# Patient Record
Sex: FEMALE | Race: WHITE | Employment: UNEMPLOYED | URBAN - METROPOLITAN AREA
[De-identification: names, ages, dates, MRNs, and addresses within clinical notes are randomized per-mention and may not be internally consistent; named-entity substitution may affect disease eponyms.]

---

## 2017-02-22 ENCOUNTER — TRANSFERRED RECORDS (OUTPATIENT)
Dept: HEALTH INFORMATION MANAGEMENT | Facility: CLINIC | Age: 27
End: 2017-02-22

## 2017-02-22 LAB — PAP-ABSTRACT: NORMAL

## 2018-01-28 ENCOUNTER — HEALTH MAINTENANCE LETTER (OUTPATIENT)
Age: 28
End: 2018-01-28

## 2018-12-18 NOTE — PROGRESS NOTES
SUBJECTIVE:   CC: Flower Logan is an 28 year old woman who presents for preventive health visit.     Physical   Annual:     Getting at least 3 servings of Calcium per day:  Yes    Bi-annual eye exam:  NO    Dental care twice a year:  Yes    Sleep apnea or symptoms of sleep apnea:  None    Diet:  Regular (no restrictions)    Frequency of exercise:  None    Taking medications regularly:  Yes    Medication side effects:  None    Additional concerns today:  Yes    PHQ-2 Total Score: 5      Health Maintenance:  Pap Smear  Done? 2/22/2017 Q3 year years    Depression and Anxiety Follow-Up    Status since last visit: Comes and goes    Other associated symptoms:Panic attack    Complicating factors:     Significant life event: Yes-  Difficulty transitioning from school to finding a job     Current substance abuse: None    PHQ 12/20/2018   PHQ-9 Total Score 13   Q9: Suicide Ideation Not at all     No flowsheet data found.  In the past two weeks have you had thoughts of suicide or self-harm?  No.    Do you have concerns about your personal safety or the safety of others?   No  PHQ-9  English  PHQ-9   Any Language  DERIAN-7  Suicide Assessment Five-step Evaluation and Treatment (SAFE-T)    Today's PHQ-2 Score:   PHQ-2 ( 1999 [employer]) 12/20/2018   Q1: Little interest or pleasure in doing things 3   Q2: Feeling down, depressed or hopeless 2   PHQ-2 Score 5   Q1: Little interest or pleasure in doing things Nearly every day   Q2: Feeling down, depressed or hopeless More than half the days   PHQ-2 Score 5       Abuse: Current or Past(Physical, Sexual or Emotional)- No  Do you feel safe in your environment? Yes    Social History     Tobacco Use     Smoking status: Current Every Day Smoker     Smokeless tobacco: Never Used   Substance Use Topics     Alcohol use: Yes     Comment: 1/2 pack per week      Alcohol Use 12/20/2018   If you drink alcohol do you typically have greater than 3 drinks per day OR greater than 7 drinks per week?  Yes   AUDIT SCORE  26     AUDIT - Alcohol Use Disorders Identification Test - Reproduced from the World Health Organization Audit 2001 (Second Edition) 12/20/2018   1.  How often do you have a drink containing alcohol? 4 or more times a week   2.  How many drinks containing alcohol do you have on a typical day when you are drinking? 7 to 9   3.  How often do you have five or more drinks on one occasion? Weekly   4.  How often during the last year have you found that you were not able to stop drinking once you had started? Weekly   5.  How often during the last year have you failed to do what was normally expected of you because of drinking? Less than monthly   6.  How often during the last year have you needed a first drink in the morning to get yourself going after a heavy drinking session? Monthly   7.  How often during the last year have you had a feeling of guilt or remorse after drinking? Weekly   8.  How often during the last year have you been unable to remember what happened the night before because of your drinking? Weekly   9.  Have you or someone else been injured because of your drinking? No   10. Has a relative, friend, doctor or other health care worker been concerned about your drinking or suggested you cut down? Yes, during the last year   TOTAL SCORE 26       Reviewed orders with patient.  Reviewed health maintenance and updated orders accordingly - Yes  BP Readings from Last 3 Encounters:   12/20/18 124/60   05/26/15 102/62   05/12/15 134/80    Wt Readings from Last 3 Encounters:   12/20/18 80 kg (176 lb 6.4 oz)   05/26/15 66.3 kg (146 lb 2 oz)   05/12/15 64.4 kg (141 lb 14.4 oz)                  There is no problem list on file for this patient.    History reviewed. No pertinent surgical history.    Social History     Tobacco Use     Smoking status: Current Every Day Smoker     Smokeless tobacco: Never Used   Substance Use Topics     Alcohol use: Yes     Comment: 1/2 pack per week      Family  "History   Problem Relation Age of Onset     Family History Negative Mother      Alcoholism Paternal Grandfather          Current Outpatient Medications   Medication Sig Dispense Refill     propranolol (INDERAL) 60 MG tablet Take 60 mg by mouth daily       No Known Allergies  Recent Labs   Lab Test 05/12/15  1403   TSH 1.91        Mammogram not appropriate for this patient based on age.    Pertinent mammograms are reviewed under the imaging tab.  History of abnormal Pap smear: Last 3 Pap and HPV Results:       Reviewed and updated as needed this visit by clinical staff  Tobacco  Allergies  Meds  Med Hx  Surg Hx  Fam Hx  Soc Hx        Reviewed and updated as needed this visit by Provider            Review of Systems   Constitutional: Negative for chills and fever.   HENT: Negative for congestion, ear pain, hearing loss and sore throat.    Eyes: Negative for pain and visual disturbance.   Respiratory: Negative for cough and shortness of breath.    Cardiovascular: Positive for palpitations. Negative for chest pain and peripheral edema.   Gastrointestinal: Negative for abdominal pain, constipation, diarrhea, heartburn, hematochezia and nausea.   Breasts:  Negative for tenderness, breast mass and discharge.   Genitourinary: Negative for dysuria, frequency, genital sores, hematuria, pelvic pain, urgency, vaginal bleeding and vaginal discharge.   Musculoskeletal: Negative for arthralgias, joint swelling and myalgias.   Skin: Negative for rash.   Neurological: Negative for dizziness, weakness, headaches and paresthesias.   Psychiatric/Behavioral: Positive for mood changes. The patient is nervous/anxious.         OBJECTIVE:   /60   Pulse 106   Temp 97.6  F (36.4  C) (Oral)   Ht 1.664 m (5' 5.5\")   Wt 80 kg (176 lb 6.4 oz)   LMP 11/30/2018 (Exact Date)   SpO2 99%   BMI 28.91 kg/m    Physical Exam  GENERAL: healthy, alert and no distress  EYES: Eyes grossly normal to inspection, PERRL and conjunctivae and " sclerae normal  HENT: ear canals and TM's normal, nose and mouth without ulcers or lesions  NECK: no adenopathy, no asymmetry, masses, or scars and thyroid normal to palpation  RESP: lungs clear to auscultation - no rales, rhonchi or wheezes  BREAST: normal without masses, tenderness or nipple discharge and no palpable axillary masses or adenopathy  CV: regular rate and rhythm, normal S1 S2, no S3 or S4, no murmur, click or rub, no peripheral edema and peripheral pulses strong  ABDOMEN: soft, nontender, no hepatosplenomegaly, no masses and bowel sounds normal  MS: no gross musculoskeletal defects noted, no edema  SKIN: no suspicious lesions or rashes  NEURO: Normal strength and tone, mentation intact and speech normal  PSYCH: mentation appears normal, affect normal/bright    Diagnostic Test Results:  none     ASSESSMENT/PLAN:   1. Routine general medical examination at a health care facility  27 yo female here to establish care and for (overdue) annual preventive health physical.  BMI overweight and BP normal.  Remainder of exam findings are normal.  Focused today's visit on alcohol counseling.  Discussed age-appropriate health maintanence.  Additional concerns addressed.    2. Alcohol abuse with other alcohol-induced disorder (H)  Drinks about 10-15 shots of vodka daily.  Is in individual therapy and has seen a psychiatrist.  Is thinking about quitting.  Counseled on health risks of continuing to drink and possibility of withdrawal symptoms.  Advised to quit and offered referral for substance abuse treatment - pt is not ready yet (reports she is 3/10 on readiness to change scale).    - Will check blood counts and CMP today.    - Pt agreed to have care coordinator contact her to discuss further.  - Comprehensive metabolic panel (BMP + Alb, Alk Phos, ALT, AST, Total. Bili, TP)  - CBC with platelets  - CARE COORDINATION REFERRAL    3. DERIAN (generalized anxiety disorder)  Poorly controlled.  Has been prescribed  "trazodone and serotonin specific reuptake inhibitor by psychiatrist, but does not take these.  Is in therapy.  Reports drinking is her form of self-medicating and has good insight about this being unhealthy behavior and that alcohol makes this problem worse.  Is in therapy too.  Alcohol abuse treatment and quitting drinking would be the first step in treatment of condition.    4. Tobacco abuse  Not interested in quitting at this time.  Understands health hazards.    5. Screen for STD (sexually transmitted disease)  - HIV Antigen Antibody Combo  - Treponema Abs w Reflex to RPR and Titer    6. Screening for endocrine, metabolic and immunity disorder  - Hemoglobin A1c    8. Need for Tdap vaccination  - VACCINE ADMINISTRATION, INITIAL  - TDAP VACCINE (ADACEL)    COUNSELING:  Reviewed preventive health counseling, as reflected in patient instructions    BP Readings from Last 1 Encounters:   12/20/18 124/60     Estimated body mass index is 28.91 kg/m  as calculated from the following:    Height as of this encounter: 1.664 m (5' 5.5\").    Weight as of this encounter: 80 kg (176 lb 6.4 oz).      Weight management plan: Discussed healthy diet and exercise guidelines     reports that she has been smoking.  she has never used smokeless tobacco.  Tobacco Cessation Action Plan: Information offered: Patient not interested at this time    Counseling Resources:  ATP IV Guidelines  Pooled Cohorts Equation Calculator  Breast Cancer Risk Calculator  FRAX Risk Assessment  ICSI Preventive Guidelines  Dietary Guidelines for Americans, 2010  USDA's MyPlate  ASA Prophylaxis  Lung CA Screening    Jose A Godinez MD  [facility]  Answers for HPI/ROS submitted by the patient on 12/20/2018   Annual Exam:  If you checked off any problems, how difficult have these problems made it for you to do your work, take care of things at home, or get along with other people?: Very difficult  PHQ9 TOTAL SCORE: 13    "

## 2018-12-18 NOTE — PATIENT INSTRUCTIONS
Tetanus shot  Lab draw today - I will contact you via [employer] with results  Referral to my  (she will contact you)  Let me know when you are ready for a referral for substance abuse treatment      Patient Education     Prevention Guidelines, Women Ages 18 to 39  Screening tests and vaccines are an important part of managing your health. A screening test is done to find possible disorders or diseases in people who don't have any symptoms. The goal is to find a disease early so lifestyle changes can be made and you can be watched more closely to reduce the risk of disease, or to detect it early enough to treat it most effectively. Screening tests are not considered diagnostic, but are used to determine if more testing is needed. Health counseling is essential, too. Below are guidelines for these, for women ages 18 to 39. Talk with your healthcare provider to make sure you re up-to-date on what you need.  Screening Who needs it How often   Alcohol misuse All women in this age group At routine exams   Blood pressure All women in this age group Yearly checkup if your blood pressure is normal  Normal blood pressure is less than 120/80 mm Hg  If your blood pressure reading is higher than normal, follow the advice of your healthcare provider   Breast cancer All women in this age group should talk with their healthcare providers about the need for clinical breast exams (CBE)1 Clinical breast exam every 3 years1   Cervical cancer Women ages 21 and older Women between ages 21 and 29 should have a Pap test every 3 years; women between ages 30 and 65 are advised to have a Pap test plus an HPV test every 5 years   Chlamydia Sexually active women ages 25 and younger, and women at increased risk for infection (such as having multiple sex partners) Every year if you're at risk or have symptoms   Depression All women in this age group At routine exams   Type 2 diabetes, prediabetes All women with no symptoms who are  overweight or obese and have 1 or more other risk factors for diabetes At least every 3 years. Also, testing for diabetes during pregnancy after the 24th week.    Type 2 diabetes, prediabetes All women diagnosed with gestational diabetes Lifelong testing every 3 years   Type 2 diabetes All women with prediabetes Every year   Gonorrhea Sexually active women at increased risk for infection At routine exams   Hepatitis C Anyone at increased risk At routine exams   HIV All women should be tested at least once for HIV between the ages of 13 and 64 At routine exams. Those with risk factors for HIV should be tested at least annually.    Obesity All women in this age group At routine exams   Syphilis Women at increased risk for infection should talk with their healthcare provider At routine exams   Tuberculosis Women at increased risk for infection should talk with their healthcare provider Ask your healthcare provider   Vision All women in this age group At least 1 complete exam in your 20s, and 2 in your 30s   Vaccine2 Who needs it How often   Chickenpox (varicella) All women in this age group who have no record of this infection or vaccine 2 doses; the second dose should be given 4 to 8 weeks after the first dose   Hepatitis A Women at increased risk for infection should talk with their healthcare provider 2 doses given at least 6 months apart   Hepatitis B Women at increased risk for infection should talk with their healthcare provider 3 doses over 6 months; second dose should be given 1 month after the first dose; the third dose should be given at least 2 months after the second dose and at least 4 months after the first dose   Haemophilus influenzae Type B (HIB) Women at increased risk for infection should talk with their healthcare provider 1 to 3 doses   Human papillomavirus (HPV) All women in this age group up to age 26 3 doses; the second dose should be given 1 to 2 months after the first dose and the third dose  given 6 months after the first dose   Influenza (flu) All women in this age group Once a year   Measles, mumps, rubella (MMR) All women in this age group who have no record of these infections or vaccines 1 or 2 doses   Meningococcal Women at increased risk for infection should talk with their healthcare provider 1 or more doses   Pneumococcal conjugate vaccine (PCV13) and pneumococcal polysaccharide vaccine (PPSV23) Women at increased risk for infection should talk with their healthcare provider PCV13: 1 dose ages 19 to 65 (protects against 13 types of pneumococcal bacteria)  PPSV23: 1 to 2 doses through age 64, or 1 dose at 65 or older (protects against 23 types of pneumococcal bacteria)      Tetanus/diphtheria/pertussis (Td/Tdap) booster All women in this age group Td every 10 years, or a one-time dose of Tdap instead of a Td booster after age 18, then Td every 10 years   Counseling Who needs it How often   BRCA gene mutation testing for breast and ovarian cancer susceptibility Women with increased risk for having gene mutation When your risk is known   Breast cancer and chemoprevention Women at high risk for breast cancer When your risk is known   Diet and exercise Women who are overweight or obese When diagnosed, and then at routine exams   Domestic violence Women at the age in which they are able to have children At routine exams   Sexually transmitted infection prevention Women who are sexually active At routine exams   Skin cancer Prevention of skin cancer in fair-skinned adults At routine exams   Use of tobacco and the health effects it can cause All women in this age group Every visit   1 According to the ACS, women ages 20 to 39 years should have a clinical breast exam (CBE) as part of their routine health exam every 3 years. Breast self-exams are an option for women starting in their 20s. But the USPSTF does not recommend CBE.  Date Last Reviewed: 10/1/2017    8568-3092 [employer] [address]  [place] [address]. All rights reserved. This information is not intended as a substitute for professional medical care. Always follow your healthcare professional's instructions.

## 2018-12-20 ENCOUNTER — OFFICE VISIT (OUTPATIENT)
Dept: PEDIATRICS | Facility: CLINIC | Age: 28
End: 2018-12-20
Payer: COMMERCIAL

## 2018-12-20 VITALS
DIASTOLIC BLOOD PRESSURE: 60 MMHG | TEMPERATURE: 97.6 F | WEIGHT: 176.4 LBS | OXYGEN SATURATION: 99 % | BODY MASS INDEX: 28.35 KG/M2 | HEART RATE: 106 BPM | SYSTOLIC BLOOD PRESSURE: 124 MMHG | HEIGHT: 66 IN

## 2018-12-20 DIAGNOSIS — F41.1 GAD (GENERALIZED ANXIETY DISORDER): ICD-10-CM

## 2018-12-20 DIAGNOSIS — Z13.0 SCREENING FOR ENDOCRINE, METABOLIC AND IMMUNITY DISORDER: ICD-10-CM

## 2018-12-20 DIAGNOSIS — Z72.0 TOBACCO ABUSE: ICD-10-CM

## 2018-12-20 DIAGNOSIS — F10.188 ALCOHOL ABUSE WITH OTHER ALCOHOL-INDUCED DISORDER (H): ICD-10-CM

## 2018-12-20 DIAGNOSIS — Z00.00 ROUTINE GENERAL MEDICAL EXAMINATION AT A HEALTH CARE FACILITY: Primary | ICD-10-CM

## 2018-12-20 DIAGNOSIS — Z11.3 SCREEN FOR STD (SEXUALLY TRANSMITTED DISEASE): ICD-10-CM

## 2018-12-20 DIAGNOSIS — Z23 NEED FOR TDAP VACCINATION: ICD-10-CM

## 2018-12-20 DIAGNOSIS — Z13.228 SCREENING FOR ENDOCRINE, METABOLIC AND IMMUNITY DISORDER: ICD-10-CM

## 2018-12-20 DIAGNOSIS — Z13.29 SCREENING FOR ENDOCRINE, METABOLIC AND IMMUNITY DISORDER: ICD-10-CM

## 2018-12-20 LAB
ERYTHROCYTE [DISTWIDTH] IN BLOOD BY AUTOMATED COUNT: 12.3 % (ref 10–15)
HBA1C MFR BLD: 5.1 % (ref 0–5.6)
HCT VFR BLD AUTO: 42.9 % (ref 35–47)
HGB BLD-MCNC: 14 G/DL (ref 11.7–15.7)
MCH RBC QN AUTO: 34.2 PG (ref 26.5–33)
MCHC RBC AUTO-ENTMCNC: 32.6 G/DL (ref 31.5–36.5)
MCV RBC AUTO: 105 FL (ref 78–100)
PLATELET # BLD AUTO: 296 10E9/L (ref 150–450)
RBC # BLD AUTO: 4.09 10E12/L (ref 3.8–5.2)
WBC # BLD AUTO: 7.8 10E9/L (ref 4–11)

## 2018-12-20 PROCEDURE — 85027 COMPLETE CBC AUTOMATED: CPT | Performed by: INTERNAL MEDICINE

## 2018-12-20 PROCEDURE — 90715 TDAP VACCINE 7 YRS/> IM: CPT | Performed by: INTERNAL MEDICINE

## 2018-12-20 PROCEDURE — 83036 HEMOGLOBIN GLYCOSYLATED A1C: CPT | Performed by: INTERNAL MEDICINE

## 2018-12-20 PROCEDURE — 99385 PREV VISIT NEW AGE 18-39: CPT | Mod: 25 | Performed by: INTERNAL MEDICINE

## 2018-12-20 PROCEDURE — 87389 HIV-1 AG W/HIV-1&-2 AB AG IA: CPT | Performed by: INTERNAL MEDICINE

## 2018-12-20 PROCEDURE — 86780 TREPONEMA PALLIDUM: CPT | Performed by: INTERNAL MEDICINE

## 2018-12-20 PROCEDURE — 80053 COMPREHEN METABOLIC PANEL: CPT | Performed by: INTERNAL MEDICINE

## 2018-12-20 PROCEDURE — 36415 COLL VENOUS BLD VENIPUNCTURE: CPT | Performed by: INTERNAL MEDICINE

## 2018-12-20 PROCEDURE — 90471 IMMUNIZATION ADMIN: CPT | Performed by: INTERNAL MEDICINE

## 2018-12-20 RX ORDER — PROPRANOLOL HYDROCHLORIDE 60 MG/1
60 TABLET ORAL DAILY
COMMUNITY
End: 2020-08-03

## 2018-12-20 ASSESSMENT — ENCOUNTER SYMPTOMS
PALPITATIONS: 1
CHILLS: 0
JOINT SWELLING: 0
DYSURIA: 0
PARESTHESIAS: 0
DIARRHEA: 0
HEMATURIA: 0
DIZZINESS: 0
MYALGIAS: 0
SHORTNESS OF BREATH: 0
ABDOMINAL PAIN: 0
CONSTIPATION: 0
HEARTBURN: 0
COUGH: 0
ARTHRALGIAS: 0
EYE PAIN: 0
SORE THROAT: 0
HEMATOCHEZIA: 0
BREAST MASS: 0
FEVER: 0
NAUSEA: 0
NERVOUS/ANXIOUS: 1
HEADACHES: 0
FREQUENCY: 0
WEAKNESS: 0

## 2018-12-20 ASSESSMENT — ANXIETY QUESTIONNAIRES
GAD7 TOTAL SCORE: 21
1. FEELING NERVOUS, ANXIOUS, OR ON EDGE: NEARLY EVERY DAY
IF YOU CHECKED OFF ANY PROBLEMS ON THIS QUESTIONNAIRE, HOW DIFFICULT HAVE THESE PROBLEMS MADE IT FOR YOU TO DO YOUR WORK, TAKE CARE OF THINGS AT HOME, OR GET ALONG WITH OTHER PEOPLE: EXTREMELY DIFFICULT
7. FEELING AFRAID AS IF SOMETHING AWFUL MIGHT HAPPEN: NEARLY EVERY DAY
3. WORRYING TOO MUCH ABOUT DIFFERENT THINGS: NEARLY EVERY DAY
2. NOT BEING ABLE TO STOP OR CONTROL WORRYING: NEARLY EVERY DAY
6. BECOMING EASILY ANNOYED OR IRRITABLE: NEARLY EVERY DAY
5. BEING SO RESTLESS THAT IT IS HARD TO SIT STILL: NEARLY EVERY DAY

## 2018-12-20 ASSESSMENT — MIFFLIN-ST. JEOR: SCORE: 1548.96

## 2018-12-20 ASSESSMENT — PATIENT HEALTH QUESTIONNAIRE - PHQ9
SUM OF ALL RESPONSES TO PHQ QUESTIONS 1-9: 13
5. POOR APPETITE OR OVEREATING: NEARLY EVERY DAY
10. IF YOU CHECKED OFF ANY PROBLEMS, HOW DIFFICULT HAVE THESE PROBLEMS MADE IT FOR YOU TO DO YOUR WORK, TAKE CARE OF THINGS AT HOME, OR GET ALONG WITH OTHER PEOPLE: VERY DIFFICULT
SUM OF ALL RESPONSES TO PHQ QUESTIONS 1-9: 13

## 2018-12-21 ENCOUNTER — PATIENT OUTREACH (OUTPATIENT)
Dept: CARE COORDINATION | Facility: CLINIC | Age: 28
End: 2018-12-21

## 2018-12-21 LAB
ALBUMIN SERPL-MCNC: 4.2 G/DL (ref 3.4–5)
ALP SERPL-CCNC: 81 U/L (ref 40–150)
ALT SERPL W P-5'-P-CCNC: 107 U/L (ref 0–50)
ANION GAP SERPL CALCULATED.3IONS-SCNC: 12 MMOL/L (ref 3–14)
AST SERPL W P-5'-P-CCNC: 105 U/L (ref 0–45)
BILIRUB SERPL-MCNC: 0.6 MG/DL (ref 0.2–1.3)
BUN SERPL-MCNC: 12 MG/DL (ref 7–30)
CALCIUM SERPL-MCNC: 10 MG/DL (ref 8.5–10.1)
CHLORIDE SERPL-SCNC: 105 MMOL/L (ref 94–109)
CO2 SERPL-SCNC: 19 MMOL/L (ref 20–32)
CREAT SERPL-MCNC: 0.74 MG/DL (ref 0.52–1.04)
GFR SERPL CREATININE-BSD FRML MDRD: >90 ML/MIN/{1.73_M2}
GLUCOSE SERPL-MCNC: 72 MG/DL (ref 70–99)
POTASSIUM SERPL-SCNC: 4.7 MMOL/L (ref 3.4–5.3)
PROT SERPL-MCNC: 8.2 G/DL (ref 6.8–8.8)
SODIUM SERPL-SCNC: 136 MMOL/L (ref 133–144)

## 2018-12-21 ASSESSMENT — ACTIVITIES OF DAILY LIVING (ADL): DEPENDENT_IADLS:: INDEPENDENT

## 2018-12-21 ASSESSMENT — PATIENT HEALTH QUESTIONNAIRE - PHQ9: SUM OF ALL RESPONSES TO PHQ QUESTIONS 1-9: 13

## 2018-12-21 ASSESSMENT — ANXIETY QUESTIONNAIRES: GAD7 TOTAL SCORE: 21

## 2018-12-21 NOTE — PROGRESS NOTES
Clinic Care Coordination Contact  OUTREACH    Referral Information: Mental Wellness/ Chemical Health Assessments- Pt has a therapist and psychiatrist - 3/10 ready to make a change     Referral Source: PCP    Chief Complaint   Patient presents with     Clinic Care Coordination - Initial     Dual- MH/CD        Universal Utilization: No major concerns noted  Clinic Utilization is sporadic.   Utilization    Last refreshed: 12/20/2018  6:34 PM:  Hospital Admissions 0           Last refreshed: 12/20/2018  6:34 PM:  ED Visits 0           Last refreshed: 12/20/2018  6:34 PM:  No Show Count (past year) 0              Current as of: 12/20/2018  6:34 PM            Clinical Concerns:  Current Medical Concerns:Pt presenting to the clinic for a routine medical examination.  Pt noted to have anxiety/depression and chemical dependency.   Current Behavioral Concerns: Alcohol Dependence, Daily Tobacco Use    Motivational Interviewing  Target Behavior: alcohol use    Stage of Change: CONTEMPLATION (Considering change and yet undecided)    MI Intervention: Expressed Empathy/Understanding, Permission to raise concern or advise, Reflections: simple and complex and Change talk (evoked)     Change Talk Expressed by the Patient: Reasons to change Need to change Taking steps    Provider Response to Change Talk: E - Evoked more info from patient about behavior change, A - Affirmed patient's thoughts, decisions, or attempts at behavior change, R - Reflected patient's change talk and S - Summarized patient's change talk statements    Education Provided to patient:  Offered to send pt information about available programs that could potentially be a good fit for the patient.   Health Maintenance Reviewed: Due/Overdue   Clinical Pathway: Clinic Care Coordination Anxiety Assessment  Anxiety  Currently being treated or treated in past for your anxiety?: Yes  Type of treatment:: Counseling, Medication  Based on results of your GAD7 you may be  experiencing anxiety: Yes  Is your anxiety:: Moderate  Picture where you want to be in the future, what steps would you take to get there?: Social, Medications, Alternative Therapy  Clinic Care Coordinator - Depression Initial Assessment  Pt identified current zone as: Yellow  Pt identifies current symptoms of: change in sleeping pattern, feelings of worthlessness, inactivity or withdrawal from typically pleasurable activities and feelings of hopelessness and helplessness    Most recent PHQ-9 score:   PHQ-9 SCORE 12/20/2018   PHQ-9 Total Score MyChart 13 (Moderate depression)   PHQ-9 Total Score 13     Pt is taking medications as prescribed:  Yes  Currently being treated or treated in the past for your depression?: Yes  Patient is using individual therapy and medication(s) Trazodone to treat depression  Benefits of medication were  were  discussed.   Patient indicates good understanding of medication and need to continue to take, even if feeling better.    Use of caffeine, alcohol, and other mood-altering substances   were  discussed.    Patient was encouraged to pursue CD treatment. Pt is still hopeful that she can continue to drink alcohol, but in a controlled moderated way. Pt is not interested in pursuing programs that promote lifelong abstinence.   Benefits of counseling  were  discussed.   Patient is is interested in seeing a counselor   Patient was assisted with mental health resources: CD treatment. Will consider options provided. Still in contemplation phase.    Medication Management:  Followed by psychiatry.     Living Situation:  Current living arrangement:: I live alone  Type of residence:: Apartment     Psychosocial:  Faith or spiritual beliefs that impact treatment:: No  Mental health DX:: Yes  Mental health DX how managed:: Medication, Outpatient Counseling  Mental health management concern: Yes- Pt has been working with a counselor on a weekly basis for about a year and has shown very little  improvement with alcohol consumption.   Informal Support system:: Parent, Friends, Significant other  Financial/Insurance: Pt does not have financial concerns at this time. Parents are supportive and helpful.     Resources and Interventions:  Community Resources: OP Mental Health  Referrals Placed: Sent pt information about available treatment options.     Patient/Caregiver understanding: Pt reports understanding and denies any additional questions or concerns at this times. RAHUL MCKEON engaged in AIDET communication during encounter.    Outreach Frequency: monthly    Plan: RAHUL MCKEON will provide patient with resources for her to review. Pt will look at resources and discuss with a counselor. RAHUL CC to follow-up in 4 weeks.     CHRISTEN Romero  Clinic Care Coordinator  [phone]  [email or web address]

## 2018-12-21 NOTE — LETTER
[facility] CARE COORDINATION  [address] [place] [address]    December 26, 2018    Flower Logan  [address] [place] [address]      Dear Flower,    I am a clinic care coordinator who works at the [facility]. I wanted to thank you for spending the time to talk with me.  I wanted to introduce myself and provide you with my contact information so that you can call me with questions or concerns about your health care. Included is a flyer with a description of clinic care coordination and how I can further assist you.     I have enclosed some educational material. Please review and call me with any questions.    [facility] call [phone]  [email or web address]-[email or web address]-[email or web address]  Outpatient Treatment Options at [facility]  All of our programs are designed to help patients understand the negative effects of alcohol and/or drugs on their lives and relationships. Patients will work with their counselor to develop a personalized plan that outlines treatment goals. By working towards these goals, individuals will develop a positive self-image and learn the coping skills necessary to achieve and maintain recovery.   Our outpatient treatment options include:   Intensive outpatient treatment  Outpatient treatment  Co-occurring outpatient groups    [facility]  [facility]  [address]  [address] [place], [place] [address]. [place] [address]  Addiction Medicine provides ongoing care for patients with the disease of addiction, ages 16 and up.   Common problems that may warrant referral to the Addiction Medicine Service are:  Alcohol use disorder - diagnosis, treatment referral, acute and protracted withdrawal management, and ongoing medication assisted treatment including Antabuse and Naltrexone.      [facility]  [email or web address]  [phone]  [facility] is an abstinence-based, gender-responsive, and trauma-informed outpatient treatment program for adult women experiencing substance use and mental health concerns. Outpatient treatment is designed for individuals who are able to address their behavioral health issues while living at home and continuing to work, attend school, and fulfill family responsibilities. Program services are available during both morning and evening hours to accommodate individual schedules.    [facility]  [phone]  [email or web address]/our-[email or web address]/drug-[email or web address]-treatment/adult-co-occurring-chemical-dependency-treatment/  [place], [place], [place], [place]  [facility] ([facility]) is a group of professional Anglican clinicians from the fields of psychology, clinical social work, marriage & family therapy, nursing, and psychiatry, who are committed to helping persons experiencing personal, emotional, marriage, family, or psychological problems.    Online or Informal Forums May also be beneficial  [facility]   https://www.[employer]recR[employer].org/addiction-recovery/alcohol-abuse/    Article discussing Abstinence vs. Moderation  https://www.[employer]rec[employer].org/smart-articles/pwdfufghrp-ti-mtaofehcqt/    I hope this information is helpful. Please feel free to contact me at [phone], with any questions or concerns.     Sincerely,     Nany Bashir

## 2018-12-22 LAB — T PALLIDUM AB SER QL: NONREACTIVE

## 2018-12-24 LAB — HIV 1+2 AB+HIV1 P24 AG SERPL QL IA: NONREACTIVE

## 2019-01-31 ENCOUNTER — PATIENT OUTREACH (OUTPATIENT)
Dept: CARE COORDINATION | Facility: CLINIC | Age: 29
End: 2019-01-31

## 2019-01-31 NOTE — PROGRESS NOTES
Clinic Care Coordination Contact  OUTREACH    Chief Complaint   Patient presents with     Clinic Care Coordination - Follow-up     Behavioral Health     Clinical Concerns:  Current Medical Concerns:  Pt had presented to the clinic for a routine medical exam. During visit pt was noted to have alcohol abuse with other alcohol induced disorder. RAHUL MCKEON reached out to pt to provide resources for Chemical Dependency treatment. Pt was in the pre contemplation phase, but was open to learning about resources that were available.     Follow-up with patient today to follow up. Pt reports that she was able to find a job and is now employed full time which has helped some. Pt is still drinking about 4 x a week. Discussed the e-mail and pt isn't interested in setting anything up at this time.     Reviewed recommendations from PCP. Pt reports that she was supposed to follow-up in one month. Offered to assist with appointment scheduling. Pt declines and will call to schedule.     Patient/Caregiver understanding: Pt reports understanding and denies any additional questions or concerns at this times. RAHUL MCKEON engaged in AIDET communication during encounter.    Plan: No further outreaches will be made at this time unless a new referral is made or a change in the pt's status occurs. Patient was provided with this writer's contact information and encouraged to call with any questions or concerns.    CHRISTEN Romero  Clinic Care Coordinator  [phone]  [email or web address]

## 2019-06-25 ENCOUNTER — OFFICE VISIT (OUTPATIENT)
Dept: URGENT CARE | Facility: URGENT CARE | Age: 29
End: 2019-06-25
Payer: COMMERCIAL

## 2019-06-25 VITALS
HEART RATE: 92 BPM | RESPIRATION RATE: 18 BRPM | WEIGHT: 176 LBS | DIASTOLIC BLOOD PRESSURE: 64 MMHG | OXYGEN SATURATION: 99 % | SYSTOLIC BLOOD PRESSURE: 120 MMHG | TEMPERATURE: 97.4 F | BODY MASS INDEX: 28.84 KG/M2

## 2019-06-25 DIAGNOSIS — J02.9 SORE THROAT: Primary | ICD-10-CM

## 2019-06-25 LAB
DEPRECATED S PYO AG THROAT QL EIA: NORMAL
SPECIMEN SOURCE: NORMAL

## 2019-06-25 PROCEDURE — 99213 OFFICE O/P EST LOW 20 MIN: CPT | Performed by: PHYSICIAN ASSISTANT

## 2019-06-25 PROCEDURE — 87880 STREP A ASSAY W/OPTIC: CPT | Performed by: PHYSICIAN ASSISTANT

## 2019-06-25 PROCEDURE — 87081 CULTURE SCREEN ONLY: CPT | Performed by: PHYSICIAN ASSISTANT

## 2019-06-25 NOTE — PROGRESS NOTES
SUBJECTIVE:  Flower Logan is a 29 year old female with a chief complaint of sore throat.  Onset of symptoms was 3 day(s) ago.    Course of illness: gradual onset and still present.  Severity moderate  Current and Associated symptoms: ear pain bilateral submandibular swelling.    Has used antacids for esophageal discomfort.  Treatment measures tried include ranitidine 150 mg once a day, tums, pepto, propping head.  Predisposing factors include esphagitis, GERD, alcoholism, smoking, anxiety.    No vomiting, no blood.    No past medical history on file.  Current Outpatient Medications   Medication Sig Dispense Refill     propranolol (INDERAL) 60 MG tablet Take 60 mg by mouth daily       Social History     Tobacco Use     Smoking status: Current Every Day Smoker     Smokeless tobacco: Never Used   Substance Use Topics     Alcohol use: Yes     Comment: 1/2 pack per week    daily alcohol: 4-8 oz daily vodka    ROS:  Review of systems negative except as stated above.    OBJECTIVE:   /64   Pulse 92   Temp 97.4  F (36.3  C)   Resp 18   Wt 79.8 kg (176 lb)   SpO2 99%   BMI 28.84 kg/m    GENERAL APPEARANCE: healthy, alert and no distress  EYES: EOMI,  PERRL, conjunctiva clear  HENT: ear canals and TM's normal.  Nose normal.  Pharynx erythematous with some exudate noted.  NECK: supple, non-tender to palpation, no adenopathy noted  RESP: lungs clear to auscultation - no rales, rhonchi or wheezes  CV: regular rates and rhythm, normal S1 S2, no murmur noted  ABDOMEN:  soft, nontender, no HSM or masses and bowel sounds normal  SKIN: no suspicious lesions or rashes    Results for orders placed or performed in visit on 06/25/19   Rapid strep screen   Result Value Ref Range    Specimen Description Throat     Rapid Strep A Screen       NEGATIVE: No Group A streptococcal antigen detected by immunoassay, await culture report.   Beta strep group A culture   Result Value Ref Range    Specimen Description Throat     Culture  Micro No beta hemolytic Streptococcus Group A isolated          ASSESSMENT:  (J02.9) Sore throat  (primary encounter diagnosis)  Comment: Likely esophagitis vs viral   Plan: Rapid strep screen, Beta strep group A culture        Patient Instructions   Follow up with Ghazal in 1-2 weeks.    ED if red flag symptoms arise      Patient Education     GERD (Adult)    The esophagus is a tube that carries food from the mouth to the stomach. A valve (the LES, lower esophageal sphincter) at the lower end of the esophagus prevents stomach acid from flowing upward. When this valve doesn't work properly, stomach contents may repeatedly flow back up (reflux) into the esophagus. This is called gastroesophageal reflux disease (GERD). GERD can irritate the esophagus. It can cause problems with pain, swallowing or breathing. In severe cases, GERD can cause recurrent pneumonia (from aspiration or breathing in particles) or other serious problems.  Symptoms of reflux include burning, pressure or sharp pain in the upper abdomen or mid to lower chest. The pain can spread to the neck, back, or shoulder. There may be belching, an acid taste in the back of the throat, chronic cough, or sore throat, or hoarseness. GERD symptoms often occur during the day after a big meal. They can also occur at night when lying down.   Home care  Lifestyle changes can help reduce symptoms. If needed, your healthcare provider may prescribe medicines. Symptoms often improve with treatment, but if treatment is stopped, the symptoms often return after a few months. So most persons with GERD will need to continue treatment or get treatment on and off.  Lifestyle changes    Limit or avoid fatty, fried, and spicy foods, as well as coffee, chocolate, mint, and foods with high acid content such as tomatoes and citrus fruit and juices (orange, grapefruit, lemon).    Don t eat large meals, especially at night. Frequent, smaller meals are best. Don't lie down right after  "eating. And don t eat anything 3 hours before going to bed.    Don't drink alcohol or smoke. As much as possible, stay away from second hand smoke.    If you are overweight, losing weight will reduce symptoms.     Don't wear tight clothing around your stomach area.    If your symptoms occur during sleep, use a foam wedge to elevate your upper body (not just your head.) Or, place 4\" blocks under the head of your bed. Or use 2 bed risers under your bedframe.  Medicines  If needed, medicines can help relieve the symptoms of GERD and prevent damage to the esophagus. Discuss a medicine plan with your healthcare provider. This may include one or more of the following medicines:    Antacids to help neutralize the normal acids in your stomach.    Acid blockers (Histamine or H2 blockers) to decrease acid production.    Acid inhibitors (proton pump inhibitors PPIs) to decrease acid production in a different way than the blockers. They may work better, but can take a little longer to take effect.  Take an antacid 30 to 60 minutes after eating and at bedtime, but not at the same time as an acid blocker.Try not to take medicines such as ibuprofen and aspirin. If you are taking aspirin for your heart or other medical reasons, talk to your healthcare provider about stopping it.  Follow-up care  Follow up with your healthcare provider or as advised by our staff.  When to seek medical advice  Call your healthcare provider if any of the following occur:    Stomach pain gets worse or moves to the lower right abdomen (appendix area)    Chest pain appears or gets worse, or spreads to the back, neck, shoulder, or arm    An over-the-counter trial of medicine doesn't relieve your symptoms    Weight loss that can't be explained    Trouble or pain swallowing    Frequent vomiting (can t keep down liquids)    Blood in the stool or vomit (red or black in color)    Feeling weak or dizzy    Fever of 100.4 F (38 C) or higher, or as directed by " your healthcare provider  Date Last Reviewed: 3/1/2018    9217-3661 [employer]. [address] [place] [address], [place] [address]. All rights reserved. This information is not intended as a substitute for professional medical care. Always follow your healthcare professional's instructions.

## 2019-06-25 NOTE — PATIENT INSTRUCTIONS
Follow up with Ghazal in 1-2 weeks.    ED if red flag symptoms arise      Patient Education     GERD (Adult)    The esophagus is a tube that carries food from the mouth to the stomach. A valve (the LES, lower esophageal sphincter) at the lower end of the esophagus prevents stomach acid from flowing upward. When this valve doesn't work properly, stomach contents may repeatedly flow back up (reflux) into the esophagus. This is called gastroesophageal reflux disease (GERD). GERD can irritate the esophagus. It can cause problems with pain, swallowing or breathing. In severe cases, GERD can cause recurrent pneumonia (from aspiration or breathing in particles) or other serious problems.  Symptoms of reflux include burning, pressure or sharp pain in the upper abdomen or mid to lower chest. The pain can spread to the neck, back, or shoulder. There may be belching, an acid taste in the back of the throat, chronic cough, or sore throat, or hoarseness. GERD symptoms often occur during the day after a big meal. They can also occur at night when lying down.   Home care  Lifestyle changes can help reduce symptoms. If needed, your healthcare provider may prescribe medicines. Symptoms often improve with treatment, but if treatment is stopped, the symptoms often return after a few months. So most persons with GERD will need to continue treatment or get treatment on and off.  Lifestyle changes    Limit or avoid fatty, fried, and spicy foods, as well as coffee, chocolate, mint, and foods with high acid content such as tomatoes and citrus fruit and juices (orange, grapefruit, lemon).    Don t eat large meals, especially at night. Frequent, smaller meals are best. Don't lie down right after eating. And don t eat anything 3 hours before going to bed.    Don't drink alcohol or smoke. As much as possible, stay away from second hand smoke.    If you are overweight, losing weight will reduce symptoms.     Don't wear tight clothing around your  "stomach area.    If your symptoms occur during sleep, use a foam wedge to elevate your upper body (not just your head.) Or, place 4\" blocks under the head of your bed. Or use 2 bed risers under your bedframe.  Medicines  If needed, medicines can help relieve the symptoms of GERD and prevent damage to the esophagus. Discuss a medicine plan with your healthcare provider. This may include one or more of the following medicines:    Antacids to help neutralize the normal acids in your stomach.    Acid blockers (Histamine or H2 blockers) to decrease acid production.    Acid inhibitors (proton pump inhibitors PPIs) to decrease acid production in a different way than the blockers. They may work better, but can take a little longer to take effect.  Take an antacid 30 to 60 minutes after eating and at bedtime, but not at the same time as an acid blocker.Try not to take medicines such as ibuprofen and aspirin. If you are taking aspirin for your heart or other medical reasons, talk to your healthcare provider about stopping it.  Follow-up care  Follow up with your healthcare provider or as advised by our staff.  When to seek medical advice  Call your healthcare provider if any of the following occur:    Stomach pain gets worse or moves to the lower right abdomen (appendix area)    Chest pain appears or gets worse, or spreads to the back, neck, shoulder, or arm    An over-the-counter trial of medicine doesn't relieve your symptoms    Weight loss that can't be explained    Trouble or pain swallowing    Frequent vomiting (can t keep down liquids)    Blood in the stool or vomit (red or black in color)    Feeling weak or dizzy    Fever of 100.4 F (38 C) or higher, or as directed by your healthcare provider  Date Last Reviewed: 3/1/2018    8865-2293 [employer]. [address] [place] [address]. All rights reserved. This information is not intended as a substitute for professional medical care. Always follow " your healthcare professional's instructions.

## 2019-06-26 LAB
BACTERIA SPEC CULT: NORMAL
SPECIMEN SOURCE: NORMAL

## 2019-11-06 ENCOUNTER — NURSE TRIAGE (OUTPATIENT)
Dept: ONCOLOGY | Facility: CLINIC | Age: 29
End: 2019-11-06

## 2019-11-06 NOTE — TELEPHONE ENCOUNTER
"  Patient took a ranitidine with no relief, at least not noticeably  Additional Information    Mild abdominal pain    Alcohol abuse known or suspected    Answer Assessment - Initial Assessment Questions  1. LOCATION: \"Where does it hurt?\"       Pain is on right upper abdomin  2. RADIATION: \"Does the pain shoot anywhere else?\" (e.g., chest, back)      Radiates a little to her back on the upper right side  3. ONSET: \"When did the pain begin?\" (e.g., minutes, hours or days ago)       When she woke up this morning around 10am  4. SUDDEN: \"Gradual or sudden onset?\"       Gradually got worse throughout the day  5. PATTERN \"Does the pain come and go, or is it constant?\"     - If constant: \"Is it getting better, staying the same, or worsening?\"       (Note: Constant means the pain never goes away completely; most serious pain is constant and it progresses)      - If intermittent: \"How long does it last?\" \"Do you have pain now?\"      (Note: Intermittent means the pain goes away completely between bouts)      Constant - stable  6. SEVERITY: \"How bad is the pain?\"  (e.g., Scale 1-10; mild, moderate, or severe)     - MILD (1-3): doesn't interfere with normal activities, abdomen soft and not tender to touch      - MODERATE (4-7): interferes with normal activities or awakens from sleep, tender to touch      - SEVERE (8-10): excruciating pain, doubled over, unable to do any normal activities        4  7. RECURRENT SYMPTOM: \"Have you ever had this type of abdominal pain before?\" If so, ask: \"When was the last time?\" and \"What happened that time?\"       She thinks she might have and was related to constipation: last Bowel movement 1-2 days ago; patient goes every other day  8. AGGRAVATING FACTORS: \"Does anything seem to cause this pain?\" (e.g., foods, stress, alcohol)      Patient did drink last night and has had a lot of stress/anxiety from work  9. CARDIAC SYMPTOMS: \"Do you have any of the following symptoms: chest pain, " "difficulty breathing, sweating, nausea?\"      Denies  10. OTHER SYMPTOMS: \"Do you have any other symptoms?\" (e.g., fever, vomiting, diarrhea)        Denies  11. PREGNANCY: \"Is there any chance you are pregnant?\" \"When was your last menstrual period?\"        Last menstrual period was around 20 days ago    Protocols used: ABDOMINAL PAIN - UPPER-A-OH    After triaging patient, she stated that she does have a problem with alcohol abuse and would like to be seen. She is concerned her symptoms are more related to this.    Spoke to Dora Zepeda and she is ok with seeing patient. Patient scheduled for tomorrow at 9:40am.  Nallely LUCIANO RN, BSN    "

## 2019-12-31 ENCOUNTER — PATIENT OUTREACH (OUTPATIENT)
Dept: CARE COORDINATION | Facility: CLINIC | Age: 29
End: 2019-12-31

## 2019-12-31 DIAGNOSIS — Z71.89 COUNSELING AND COORDINATION OF CARE: Primary | ICD-10-CM

## 2019-12-31 NOTE — PROGRESS NOTES
"Clinic Care Coordination Contact  SW CC Outreach    Notification received from Stephenie Wade CNP, that Patient is planned to be seen in the clinic on Friday for \"Substance abuse assessment.\" Pt is noted to not have health insurance.    Per chart review, Pt has been successfully reached by email in the past. Email sent to Patient on this date to discuss her Friday appointment and potential need for Clinic Care Coordination.     Plan: SW CC will await an email response from Patient. RAHUL CC will follow-up by phone in one business day if no prior response.     [facility]  Clinic Care Coordinator  Ph. [phone]  [email or web address]      "

## 2019-12-31 NOTE — LETTER
[facility] CARE COORDINATION  [address] [place], [address]  January 3, 2020    Flower Logan  [address] [place] [address]      Dear Flower,    I am a clinic care coordinator who works at the [facility]. Thank you for taking the time to speak with me today.    As we discussed, if you have MA, you will need to do a Rule 25 Assessment for chemical dependency treatment options. Per their website, To apply for an assessment, applications can be done in one of two ways:  1. Visit the [facility] during our business hours (Monday through Friday, 8 a.m.-4 p.m.). The office is located at [address] [place] [address], [address], 3rd Floor, [place] [address].   2. Download and complete an eligibility application. Fax it to [employer] at [phone] or email it to [email or web address]      You can find the application at the following link along with more information about the process: [email or web address]/[email or web address].[email or web address]    And if you do find that you are currently active with [facility], you will want to contact your insurer to discuss their process, and where they would like you to go for a chemical dependency assessment. Their phone number is Ph: [phone].    Please feel free to contact me at [phone], with any questions or concerns. We at [facility] are focused on providing you with the highest-quality healthcare experience possible and that all starts with you.     Sincerely,       Oneil Yao, [facility]  Clinic Care Coordinator  Ph. [phone]  [email or web address]

## 2019-12-31 NOTE — LETTER
"[place] CARE COORDINATION  [address] [place], [address]  December 31, 2019    Flower Logan  [address] [place] [address]      Dear Flower,    I am a clinic care coordinator who works at the [facility]. I am reaching out in preparation for your appointment in-clinic this Friday with Stephenie Farleygrecia.     Stephenie requested that I reach out to you so that she can best prepare for your Friday visit. The current reason listed for your appointment is \"Substance abuse assessment.\" We wanted to make sure that you were aware that your appointment is for a medical follow-up, and not for a chemical dependency service assessment.     That being said, I am definitely willing to help you navigate those types of services if you are interested.    Lastly, we do not have a health insurance on file for you. If you have health insurance, please bring a copy of your insurance card to your appointment. If you do not have health insurance and would like to apply for health insurance through [facility], that is something else that I could help with.     Please feel free to contact me via email or at [phone], with any questions or concerns. I look forward to hearing from you.    Sincerely,       Oneil Yao, [facility]  Clinic Care Coordinator  Ph. [phone]  [email or web address]    "

## 2020-01-03 ASSESSMENT — ACTIVITIES OF DAILY LIVING (ADL): DEPENDENT_IADLS:: INDEPENDENT

## 2020-01-03 NOTE — PROGRESS NOTES
Clinic Care Coordination Contact    Clinic Care Coordination Contact  OUTREACH    Referral Information:  Referral Source: PCP  Primary Diagnosis: Dual -  MH/CD    Chief Complaint   Patient presents with     Clinic Care Coordination - Initial     Care navigation        Universal Utilization: No noted concerns at this time. Pt does report that she had a PCP visit elsewhere recently, but this information is not currently available in the medical record. Patient likely presented to an outside provider.   Difficulty keeping appointments: No  Compliance Concerns: No  No-Show Concerns: No  No PCP office visit in Past Year: No  Utilization    Last refreshed: 1/3/2020 12:44 AM:  Hospital Admissions 0           Last refreshed: 1/3/2020 12:44 AM:  ED Visits 0           Last refreshed: 1/3/2020 12:44 AM:  No Show Count (past year) 0              Current as of: 1/3/2020 12:44 AM            Clinical Concerns:  Current Medical Concerns:    Patient Active Problem List   Diagnosis     Alcohol abuse with other alcohol-induced disorder (H)     Tobacco abuse     DERIAN (generalized anxiety disorder)       Current Behavioral Concerns: Patient endorses a need for chemical dependency treatment.    Education Provided to patient: RAHUL MCKEON introduced Care Coordination and the reason for our outreach.    Health Maintenance Reviewed: Not assessed    Medication Management:  Independent.      Functional Status:  Dependent ADLs: Independent  Dependent IADLs: Independent  Bed or wheelchair confined: No  Mobility Status: Independent    Living Situation:  Current living arrangement: I live alone  Type of residence: Apartment    Diet/Exercise/Sleep:  Not assessed with Patient at this time.      Transportation:  Transportation concerns: No  Transportation means: Regular car     Psychosocial:  Mormon or spiritual beliefs that impact treatment: No  Mental health DX: Yes  Mental health DX how managed: Medication, Outpatient Counseling  Mental health  management concern: Yes  Informal Support system: Parent, Friends, Significant other     Financial/Insurance:   Financial/Insurance concerns: No  Patient states that she [place], and [place] next [place]. Unclear which insurance is active, as [place] listed [place] active on 1.1.2020.     Resources and Interventions:  Community Resources: OP Mental Health  Supplies used at home: None  Equipment Currently Used at Home: none    Advance Care Plan/Directive  Advanced Care Plans/Directives on file: No  Advanced Care Plan/Directive Status: Considering Options    Referrals Placed: CD    Patient is not interested in completing a primary care appointment on this date. Her interest is in completing a chemical dependency assessment to get services. We discussed that if Pt [place], she would need to complete a Rule 25 assessment. If Pt [place], she would need to contact her insurer about their process for chemical dependency evaluations. Pt is in agreement with this plan.    Introduction to CC letter emailed to Patient on this date, along with information on completing a Rule 25 assessment and/or contacting [facility] [place]. See letter for additional information.       Plan: Patient does not plan to establish PCP at this clinic. Patient will follow-up with the CD resources provided on this date. No further outreaches will be made at this time unless a new referral is made or a change in the pt's status occurs. Patient was provided with this writer's contact information and encouraged to call with any questions or concerns.      Oneil Yao, [facility]  Clinic Care Coordinator  Ph. [phone]  [email or web address]

## 2020-01-13 ENCOUNTER — TELEPHONE (OUTPATIENT)
Dept: PEDIATRICS | Facility: CLINIC | Age: 30
End: 2020-01-13

## 2020-01-13 NOTE — TELEPHONE ENCOUNTER
Call placed to pt. BERTHA to get more details    -Pt has not been sen here since 12/18  -Has not established care  -Pt has been going [facility]. Pt needs to follow up with her primary care clinic    Thank you, Calderon MONTANO

## 2020-01-13 NOTE — TELEPHONE ENCOUNTER
Patient has appointments scheduled. Closing encounter.      NICOLAS FAYE MA on 1/13/2020 at 5:22 PM

## 2020-01-13 NOTE — TELEPHONE ENCOUNTER
Received  Call back from pt.   She had no insurance for awhile, so she was going to [facility]-which is a free clinic. They are recommending that she have her liver enzymes checked and have an abdominal U/S.    Pt has insurance again and would like to return to establish care with a provider at the [facility].    Routing to MA/MARYLOU to help pt schedule an appt     Thank you, Calderon MONTANO

## 2020-01-13 NOTE — TELEPHONE ENCOUNTER
Reason for Call: Request for an order or referral:    Order or referral being requested:   Abdominal U/S with doppler flow lab tests    Date needed: as soon as possible    Has the patient been seen by the PCP for this problem? YES    Additional comments: Pt has hx of substance abuse; pt seeing a clinician with [place] [facility] (?). Because of pt hx of alcohol abuse, liver enzymes have been off; [place] [facility] recommending some testing.  Pt asking for orders through [employer].    Phone number Patient can be reached at:  Cell number on file:    Telephone Information:   Mobile [phone]       Best Time:  any    Can we leave a detailed message on this number?  Not Applicable    Call taken on 1/13/2020 at 2:02 PM by Clarissa Philip

## 2020-02-10 ENCOUNTER — PATIENT OUTREACH (OUTPATIENT)
Dept: PEDIATRICS | Facility: CLINIC | Age: 30
End: 2020-02-10

## 2020-03-10 ENCOUNTER — HEALTH MAINTENANCE LETTER (OUTPATIENT)
Age: 30
End: 2020-03-10

## 2020-07-06 ENCOUNTER — VIRTUAL VISIT (OUTPATIENT)
Dept: PEDIATRICS | Facility: CLINIC | Age: 30
End: 2020-07-06
Payer: COMMERCIAL

## 2020-07-06 DIAGNOSIS — F10.188 ALCOHOL ABUSE WITH OTHER ALCOHOL-INDUCED DISORDER (H): ICD-10-CM

## 2020-07-06 DIAGNOSIS — R74.01 TRANSAMINITIS: ICD-10-CM

## 2020-07-06 DIAGNOSIS — F41.1 GAD (GENERALIZED ANXIETY DISORDER): Primary | ICD-10-CM

## 2020-07-06 DIAGNOSIS — F33.1 MODERATE EPISODE OF RECURRENT MAJOR DEPRESSIVE DISORDER (H): ICD-10-CM

## 2020-07-06 PROCEDURE — 99214 OFFICE O/P EST MOD 30 MIN: CPT | Mod: 95 | Performed by: INTERNAL MEDICINE

## 2020-07-06 RX ORDER — SERTRALINE HYDROCHLORIDE 25 MG/1
25 TABLET, FILM COATED ORAL
COMMUNITY
Start: 2020-01-20 | End: 2020-07-06

## 2020-07-06 ASSESSMENT — ANXIETY QUESTIONNAIRES
7. FEELING AFRAID AS IF SOMETHING AWFUL MIGHT HAPPEN: NEARLY EVERY DAY
7. FEELING AFRAID AS IF SOMETHING AWFUL MIGHT HAPPEN: NEARLY EVERY DAY
5. BEING SO RESTLESS THAT IT IS HARD TO SIT STILL: NEARLY EVERY DAY
2. NOT BEING ABLE TO STOP OR CONTROL WORRYING: NEARLY EVERY DAY
4. TROUBLE RELAXING: NEARLY EVERY DAY
6. BECOMING EASILY ANNOYED OR IRRITABLE: NEARLY EVERY DAY
GAD7 TOTAL SCORE: 21
GAD7 TOTAL SCORE: 21
3. WORRYING TOO MUCH ABOUT DIFFERENT THINGS: NEARLY EVERY DAY
1. FEELING NERVOUS, ANXIOUS, OR ON EDGE: NEARLY EVERY DAY
GAD7 TOTAL SCORE: 21

## 2020-07-06 ASSESSMENT — PATIENT HEALTH QUESTIONNAIRE - PHQ9
SUM OF ALL RESPONSES TO PHQ QUESTIONS 1-9: 22
SUM OF ALL RESPONSES TO PHQ QUESTIONS 1-9: 22
10. IF YOU CHECKED OFF ANY PROBLEMS, HOW DIFFICULT HAVE THESE PROBLEMS MADE IT FOR YOU TO DO YOUR WORK, TAKE CARE OF THINGS AT HOME, OR GET ALONG WITH OTHER PEOPLE: EXTREMELY DIFFICULT

## 2020-07-06 NOTE — Clinical Note
Alcohol use - lost to follow-up since 2018 with me - followed up again and is looking for help.  Is interested in resources and to start individual therapy for depression/anxiety (that is undoubtedly related to her alcohol use).  Temporarily living in [place] with parents.  Currently weaning self and detoxing on own with help of parents.  Not interested in inpatient detox or rehab.  Plan to move back to [facility] Sept 1st.

## 2020-07-06 NOTE — PROGRESS NOTES
"  Flower Logan is a 30 year old female who is being evaluated via a billable video visit.      The patient has been notified of following:     \"This video visit will be conducted via a call between you and your physician/provider. We have found that certain health care needs can be provided without the need for an in-person physical exam.  This service lets us provide the care you need with a video conversation.  If a prescription is necessary we can send it directly to your pharmacy.  If lab work is needed we can place an order for that and you can then stop by our lab to have the test done at a later time.    Video visits are billed at different rates depending on your insurance coverage.  Please reach out to your insurance provider with any questions.    If during the course of the call the physician/provider feels a video visit is not appropriate, you will not be charged for this service.\"    Patient has given verbal consent for Video visit? Yes  How would you like to [facility] your [facility]S? [facility]  Patient would like the video invitation sent by: Text to cell phone: [phone]  Will anyone else be joining your video visit? No    Subjective     Flower Logan is a 30 year old female who presents today via video visit for the following health issues:    History of Present Illness        Mental Health Follow-up:  Patient presents to follow-up on Depression & Anxiety.Patient's depression since last visit has been:  Worse  The patient is having other symptoms associated with depression.  Patient's anxiety since last visit has been:  Worse  The patient is having other symptoms associated with anxiety.  Any significant life events: relationship concerns, job concerns and grief or loss  Patient is feeling anxious or having panic attacks.  Patient has concerns about alcohol or drug use.     Social History  Tobacco Use    Smoking status: Current Every Day Smoker    Smokeless tobacco: Never Used  Alcohol use: Yes    " Comment: 1/2 pack per week   Drug use: No      Today's PHQ-9         PHQ-9 Total Score:     (P) 22   PHQ-9 Q9 Thoughts of better off dead/self-harm past 2 weeks :   (P) Several days   Thoughts of suicide or self harm:  (P) No   Self-harm Plan:        Self-harm Action:          Safety concerns for self or others: (P) No         She eats 0-1 servings of fruits and vegetables daily.She consumes 5 sweetened beverage(s) daily.She exercises with enough effort to increase her heart rate 9 or less minutes per day.  She exercises with enough effort to increase her heart rate 3 or less days per week.   She is taking medications regularly.    Discuss health maintenance - labs may be due for          Alcohol abuse - drinks 5-10 drinks per day             Social History     Tobacco Use     Smoking status: Current Every Day Smoker     Smokeless tobacco: Never Used   Substance Use Topics     Alcohol use: Yes     Comment: 1/2 pack per week      Drug use: No     PHQ 12/20/2018 7/6/2020   PHQ-9 Total Score 13 22   Q9: Thoughts of better off dead/self-harm past 2 weeks Not at all Several days   F/U: Thoughts of suicide or self-harm - No   F/U: Safety concerns - No     DERIAN-7 SCORE 12/20/2018 7/6/2020   Total Score - 21 (severe anxiety)   Total Score 21 21     Last PHQ-9 7/6/2020   1.  Little interest or pleasure in doing things 3   2.  Feeling down, depressed, or hopeless 3   3.  Trouble falling or staying asleep, or sleeping too much 3   4.  Feeling tired or having little energy 3   5.  Poor appetite or overeating 3   6.  Feeling bad about yourself 3   7.  Trouble concentrating 3   8.  Moving slowly or restless 0   Q9: Thoughts of better off dead/self-harm past 2 weeks 1   PHQ-9 Total Score 22   In the past two weeks have you had thoughts of suicide or self harm? No   Do you have concerns about your personal safety or the safety of others? No     DERIAN-7  7/6/2020   1. Feeling nervous, anxious, or on edge 3   2. Not being able to stop  or control worrying 3   3. Worrying too much about different things 3   4. Trouble relaxing 3   5. Being so restless that it is hard to sit still 3   6. Becoming easily annoyed or irritable 3   7. Feeling afraid, as if something awful might happen 3   DERIAN-7 Total Score 21   If you checked any problems, how difficult have they made it for you to do your work, take care of things at home, or get along with other people? -     In the past two weeks have you had thoughts of suicide or self-harm?  Yes  In the past two weeks have you thought of a plan or intent to harm yourself? No.  Do you have concerns about your personal safety or the safety of others?   No    Suicide Assessment Five-step Evaluation and Treatment (SAFE-T)      Was prescribed sertraline 25 mg about 3 weeks ago and did not notice benefit yet.  This was at [facility] - this has been her main prescribe for propranolol.  Psychiatrist.  - Anxiety and depression    Interested in intensive outpatient substance use programs.  - Alcohol use - broke up with partner of 10 years.  Toxic relationship.  Went to live with her parents.  - Doing research with mom - harm reduction model.  Is trying to cut out hard liquor.  - Half a liter of vodka a day a few weeks ago (close to 12 shots of vodka a day).  Has been home for about a week, has been drinking about 5 beers or seltzer drinks per day.  Drank hard liquor for the 4th of July, but otherwise is much better.    Work:  - Took week off  - Otherwise has been working from home - can do this from her parents house because of virtual work.  - Work is based in the [facility] in [place] - moving back to the [facility] September 1.    Also trying to get in with individual therapy.  Would also like to repeat blood work.    Video Start Time: 12:11 PM        Patient Active Problem List   Diagnosis     Alcohol abuse with other alcohol-induced disorder (H)     Tobacco abuse     DERIAN (generalized anxiety disorder)      History reviewed. No pertinent surgical history.    Social History     Tobacco Use     Smoking status: Current Every Day Smoker     Packs/day: 0.00     Years: 10.00     Pack years: 0.00     Types: Cigarettes     Smokeless tobacco: Never Used   Substance Use Topics     Alcohol use: Yes     Comment: daily; 5-10 drinks     Family History   Problem Relation Age of Onset     Family History Negative Mother      Alcoholism Paternal Grandfather      Substance Abuse Paternal Grandfather      Other Cancer Maternal Grandmother      Mental Illness Other          Current Outpatient Medications   Medication Sig Dispense Refill     sertraline (ZOLOFT) 50 MG tablet Take 1 tablet (50 mg) by mouth daily 30 tablet 0     propranolol (INDERAL) 60 MG tablet Take 60 mg by mouth daily       No Known Allergies    Reviewed and updated as needed this visit by Provider         Review of Systems   All other systems on a 10-point review are negative, unless otherwise noted in HPI        Objective             Physical Exam     GENERAL: Healthy, alert and no distress  EYES: Eyes grossly normal to inspection.  No discharge or erythema, or obvious scleral/conjunctival abnormalities.  RESP: No audible wheeze, cough, or visible cyanosis.  No visible retractions or increased work of breathing.    SKIN: Visible skin clear. No significant rash, abnormal pigmentation or lesions.  NEURO: Cranial nerves grossly intact.  Mentation and speech appropriate for age.  PSYCH: Mentation appears normal, affect normal/bright, judgement and insight intact, normal speech and appearance well-groomed.      Diagnostic Test Results:  none         Assessment & Plan     1. DERIAN (generalized anxiety disorder)  Poorly controlled.  Severe.  Was started on zoloft 25 with little improvement.  Will increase to 50 mg daily, however there is no doubt this is related to her alcohol use.  Will f/u via [employer] in 2 week, via video visit in 1 month, and in clinic in 2 months.    -  sertraline (ZOLOFT) 50 MG tablet; Take 1 tablet (50 mg) by mouth daily  Dispense: 30 tablet; Refill: 0    2. Moderate episode of recurrent major depressive disorder (H)  See #1  - sertraline (ZOLOFT) 50 MG tablet; Take 1 tablet (50 mg) by mouth daily  Dispense: 30 tablet; Refill: 0    3. Alcohol abuse with other alcohol-induced disorder (H)  Alcohol abuse - lost to follow-up since I last saw her in 2018.  Is interested in resources now.  Temporarily living with parents.  Currently weaning self and detoxing on own with help of parents.  Not interested in inpatient detox or rehab, though.  Plan to move back to [facility] Sept 1st.  Interested in intensive outpatient chem dependency programs.    Will have CC follow-up - not sure how many resources we can provide while in [place] (possibly virtual care?), but is returning in September.  Will repeat labs for history of transaminitis.    - CARE COORDINATION REFERRAL  - **Comprehensive metabolic panel FUTURE anytime; Future  - GGT; Future  - INR; Future  - **CBC with platelets FUTURE anytime; Future    4. Transaminitis  - **Comprehensive metabolic panel FUTURE anytime; Future  - GGT; Future  - INR; Future  - **CBC with platelets FUTURE anytime; Future     Tobacco Cessation:   reports that she has been smoking cigarettes. She has been smoking about 0.00 packs per day for the past 10.00 years. She has never used smokeless tobacco.          See Patient Instructions    Return in about 4 weeks (around 8/3/2020) for Video Visit.    Jose A Godinez MD  [facility]      Video-Visit Details    Type of service:  Video Visit    Video End Time:12:32 PM    Originating Location (pt. Location): Home    Distant Location (provider location):  [facility]     Platform used for Video Visit: [employer]    No follow-ups on file.       Jose A Godinez MD

## 2020-07-07 ENCOUNTER — PATIENT OUTREACH (OUTPATIENT)
Dept: CARE COORDINATION | Facility: CLINIC | Age: 30
End: 2020-07-07

## 2020-07-07 ASSESSMENT — ANXIETY QUESTIONNAIRES: GAD7 TOTAL SCORE: 21

## 2020-07-07 ASSESSMENT — PATIENT HEALTH QUESTIONNAIRE - PHQ9: SUM OF ALL RESPONSES TO PHQ QUESTIONS 1-9: 22

## 2020-07-07 NOTE — LETTER
[facility] CARE COORDINATION  [address] [place] [address]  July 15, 2020    Flower Logan  [address] [place] [address]      Dear Flower,    I am a clinic care coordinator who is works with Jose A Godinez MD at the [facility]. I recently tried to call and was unable to reach you. I wanted to introduce myself and provide you with my contact information for you to be able to call me with any questions or concerns. Below is a description of clinic care coordination and how I can further assist you.      The clinic care coordination team is made up of a registered nurse,  and community health worker who understand the health care system. The goal of clinic care coordination is to help you manage your health and improve access to the health care system in the most efficient manner. The team can assist you in meeting your health care goals by providing education, coordinating services, strengthening the communication among your providers and supporting you with any resource needs.    We were asked to reach out to discuss options for accessing outpatient mental health and chemical dependency services. I do see that you have [place] health plan, which may be a barrier to accessing care while out-of-state. We are available to support you as needed.     Please feel free to contact the lead Care Coordinator at [facility], Meliton Calvin, at [phone] with any questions or concerns. We are focused on providing you with the highest-quality healthcare experience possible and that all starts with you.     Sincerely,     Oneil Yao, [facility]  Clinic Care Coordinator  Ph. [phone]  [email or web address]

## 2020-07-07 NOTE — PROGRESS NOTES
Clinic Care Coordination Contact  [facility]/Voicemail    Referral Source: PCP  Notification received that Pt is interested in receiving outpatient mental health and CD services. Pt is currently living out of state with parents, and plans to return in September. Pt's health insurance, [employer], may not cover out of state services. Additionally, a therapist providing virtual care may also need to be licensed in the state Pt is currently residing in. Will plan to discuss with Patient.     Clinical Data: Care Coordinator Outreach  Outreach attempted x 1.  Left message on patient's voicemail with call back information and requested return call.  Plan: Care Coordinator will try to reach patient again in 3-5 business days.      Oneil Yao [facility]  Clinic Care Coordinator  Ph. [phone]  [email or web address]

## 2020-07-15 NOTE — PROGRESS NOTES
Clinic Care Coordination Contact  [facility]/Voicemail    Referral Source: PCP  Clinical Data: Care Coordinator Outreach  Outreach attempted x 2.  Left message on patient's voicemail with call back information and requested return call.  Plan: Care Coordinator will send care coordination introduction letter with care coordinator contact information and explanation of care coordination services via [employer]. SW CC included some helpful information about accessing care while out of state, and provided PCP SW contact information. Care Coordinator will do no further outreaches at this time.      Oneil Yao [facility]  Clinic Care Coordinator  Ph. [phone]  [email or web address]

## 2020-07-24 DIAGNOSIS — F41.1 GAD (GENERALIZED ANXIETY DISORDER): Primary | ICD-10-CM

## 2020-07-27 ENCOUNTER — PATIENT OUTREACH (OUTPATIENT)
Dept: CARE COORDINATION | Facility: CLINIC | Age: 30
End: 2020-07-27

## 2020-07-27 NOTE — PROGRESS NOTES
Clinic Care Coordination Contact  [facility]/Voicemail       Clinical Data: Care Coordinator Outreach  Outreach attempted x 1.  Left message on patient's voicemail with call back information and requested return call.    Chart Review:  Care Transition: Mental Health- Behavioral Supportst  Additional pertinent details: Oneil initially outreached to this pt beginning of the month following a provider referral and completed the workflow, but received a call back wanting to talk about care coordination. Will you call to schedule? Thank you!    Plan:  Care Coordinator will try to reach patient again in 1-2 business days.    DILLAN Bojorquez  Clinic Care Coordination  [facility] : [place], [place], [place], and [place]  Phone: [phone]    ______________________  Next Outreach:  07/28/20

## 2020-07-27 NOTE — LETTER
[facility] CARE COORDINATION  [address] [place] [address]  July 28, 2020      Flower Logan  [address] [place] [address]      Dear Flower,    I am a clinic community health worker who works with Jose A Godinez MD at [facility]. I have been trying to reach you recently to introduce Clinic Care Coordination and to see if there was anything I could assist you with.  Below is a description of clinic care coordination and how I can further assist you.      The clinic care coordination team is made up of a registered nurse,  and community health worker who understand the health care system. The goal of clinic care coordination is to help you manage your health and improve access to the health care system in the most efficient manner. The team can assist you in meeting your health care goals by providing education, coordinating services, strengthening the communication among your providers and supporting you with any resource needs.    Please feel free to contact me at [phone] with any questions or concerns. We are focused on providing you with the highest-quality healthcare experience possible and that all starts with you.     Sincerely,     DILLAN Bojorquez  Clinic Care Coordination  [facility] : [place], [place], and [place]  Phone: [phone]

## 2020-07-28 NOTE — PROGRESS NOTES
Clinic Care Coordination Contact  [facility]/Voicemail       Clinical Data: Care Coordinator Outreach  Outreach attempted x 2.  Left message on patient's voicemail with call back information and requested return call.    Plan: Care Coordinator will send care coordination introduction letter with care coordinator contact information and explanation of care coordination services via [employer]. Care Coordinator will do no further outreaches at this time.    DILLAN Bojorquez  Clinic Care Coordination  [facility] : [place], [place], [place], and [place]  Phone: [phone]

## 2020-08-03 ENCOUNTER — VIRTUAL VISIT (OUTPATIENT)
Dept: PEDIATRICS | Facility: CLINIC | Age: 30
End: 2020-08-03
Payer: COMMERCIAL

## 2020-08-03 DIAGNOSIS — F41.1 GAD (GENERALIZED ANXIETY DISORDER): ICD-10-CM

## 2020-08-03 DIAGNOSIS — F33.1 MODERATE EPISODE OF RECURRENT MAJOR DEPRESSIVE DISORDER (H): ICD-10-CM

## 2020-08-03 DIAGNOSIS — F10.188 ALCOHOL ABUSE WITH OTHER ALCOHOL-INDUCED DISORDER (H): Primary | ICD-10-CM

## 2020-08-03 PROCEDURE — 99213 OFFICE O/P EST LOW 20 MIN: CPT | Mod: 95 | Performed by: INTERNAL MEDICINE

## 2020-08-03 RX ORDER — PROPRANOLOL HYDROCHLORIDE 60 MG/1
60 TABLET ORAL DAILY
Qty: 90 TABLET | Refills: 1 | Status: SHIPPED | OUTPATIENT
Start: 2020-08-03 | End: 2020-11-02

## 2020-08-03 RX ORDER — SERTRALINE HYDROCHLORIDE 100 MG/1
100 TABLET, FILM COATED ORAL DAILY
Qty: 60 TABLET | Refills: 0 | Status: SHIPPED | OUTPATIENT
Start: 2020-08-03 | End: 2020-11-02

## 2020-08-03 RX ORDER — HYDROXYZINE HYDROCHLORIDE 25 MG/1
25 TABLET, FILM COATED ORAL 3 TIMES DAILY PRN
Qty: 30 TABLET | Refills: 1 | Status: SHIPPED | OUTPATIENT
Start: 2020-08-03

## 2020-08-03 ASSESSMENT — PATIENT HEALTH QUESTIONNAIRE - PHQ9
10. IF YOU CHECKED OFF ANY PROBLEMS, HOW DIFFICULT HAVE THESE PROBLEMS MADE IT FOR YOU TO DO YOUR WORK, TAKE CARE OF THINGS AT HOME, OR GET ALONG WITH OTHER PEOPLE: SOMEWHAT DIFFICULT
SUM OF ALL RESPONSES TO PHQ QUESTIONS 1-9: 10
SUM OF ALL RESPONSES TO PHQ QUESTIONS 1-9: 10

## 2020-08-03 ASSESSMENT — ANXIETY QUESTIONNAIRES
1. FEELING NERVOUS, ANXIOUS, OR ON EDGE: MORE THAN HALF THE DAYS
6. BECOMING EASILY ANNOYED OR IRRITABLE: MORE THAN HALF THE DAYS
5. BEING SO RESTLESS THAT IT IS HARD TO SIT STILL: MORE THAN HALF THE DAYS
GAD7 TOTAL SCORE: 14
3. WORRYING TOO MUCH ABOUT DIFFERENT THINGS: MORE THAN HALF THE DAYS
GAD7 TOTAL SCORE: 14
7. FEELING AFRAID AS IF SOMETHING AWFUL MIGHT HAPPEN: MORE THAN HALF THE DAYS
7. FEELING AFRAID AS IF SOMETHING AWFUL MIGHT HAPPEN: MORE THAN HALF THE DAYS
GAD7 TOTAL SCORE: 14
2. NOT BEING ABLE TO STOP OR CONTROL WORRYING: MORE THAN HALF THE DAYS
4. TROUBLE RELAXING: MORE THAN HALF THE DAYS

## 2020-08-03 NOTE — LETTER
My Depression Action Plan  Name: Flower Logan   Date of Birth 1990  Date: 8/3/2020    My doctor: Ghazal Godinez Mai   My clinic: [address]  [address]  [facility] [address] [phone]          GREEN    ZONE   Good Control    What it looks like:     Things are going generally well. You have normal ups and downs. You may even feel depressed from time to time, but bad moods usually last less than a day.   What you need to do:  1. Continue to care for yourself (see self care plan)  2. Check your depression survival kit and update it as needed  3. Follow your physician s recommendations including any medication.  4. Do not stop taking medication unless you consult with your physician first.           YELLOW         ZONE Getting Worse    What it looks like:     Depression is starting to interfere with your life.     It may be hard to get out of bed; you may be starting to isolate yourself from others.    Symptoms of depression are starting to last most all day and this has happened for several days.     You may have suicidal thoughts but they are not constant.   What you need to do:     1. Call your care team. Your response to treatment will improve if you keep your care team informed of your progress. Yellow periods are signs an adjustment may need to be made.     2. Continue your self-care.  Just get dressed and ready for the day.  Don't give yourself time to talk yourself out of it.    3. Talk to someone in your support network.    4. Open up your Depression Self-Care Plan/Wellness Kit.           RED    ZONE Medical Alert - Get Help    What it looks like:     Depression is seriously interfering with your life.     You may experience these or other symptoms: You can t get out of bed most days, can t work or engage in other necessary activities, you have trouble taking care of basic hygiene, or basic responsibilities, thoughts of suicide or death that  will not go away, self-injurious behavior.     What you need to do:  1. Call your care team and request a same-day appointment. If they are not available (weekends or after hours) call your local crisis line, emergency room or 911.            Depression Self-Care Plan / Wellness Kit    Self-Care for Depression  Here s the deal. Your body and mind are really not as separate as most people think.  What you do and think affects how you feel and how you feel influences what you do and think. This means if you do things that people who feel good do, it will help you feel better.  Sometimes this is all it takes.  There is also a place for medication and therapy depending on how severe your depression is, so be sure to consult with your medical provider and/ or Behavioral Health Consultant if your symptoms are worsening or not improving.     In order to better manage my stress, I will:    Exercise  Get some form of exercise, every day. This will help reduce pain and release endorphins, the  feel good  chemicals in your brain. This is almost as good as taking antidepressants!  This is not the same as joining a gym and then never going! (they count on that by the way ) It can be as simple as just going for a walk or doing some gardening, anything that will get you moving.      Hygiene   Maintain good hygiene (get out of bed in the morning, make your bed, brush your teeth, take a shower, and get dressed like you were going to work, even if you are unemployed).  If your clothes don't fit try to get ones that do.    Diet  Strive to eat foods that are good for me, drink plenty of water, and avoid excessive sugar, caffeine, alcohol, and other mood-altering substances.  Some foods that are helpful in depression are: complex carbohydrates, B vitamins, flaxseed, fish or fish oil, fresh fruits and vegetables.    Psychotherapy  Agree to participate in Individual Therapy (if recommended).    Medication  If prescribed medications, I  agree to take them.  Missing doses can result in serious side effects.  I understand that drinking alcohol, or other illicit drug use, may cause potential side effects.  I will not stop my medication abruptly without first discussing it with my provider.    Staying Connected With Others  Stay in touch with my friends, family members, and my primary care provider/team.    Use your imagination  Be creative.  We all have a creative side; it doesn t matter if it s oil painting, sand castles, or mud pies! This will also kick up the endorphins.    Witness Beauty  (AKA stop and smell the roses) Take a look outside, even in mid-winter. Notice colors, textures. Watch the squirrels and birds.     Service to others  Be of service to others.  There is always someone else in need.  By helping others we can  get out of ourselves  and remember the really important things.  This also provides opportunities for practicing all the other parts of the program.    Humor  Laugh and be silly!  Adjust your TV habits for less news and crime-drama and more comedy.    Control your stress  Try breathing deep, massage therapy, biofeedback, and meditation. Find time to relax each day.     Crisis Text Line  http://www.crisistextline.org    The Crisis Text Line serves anyone, in any type of crisis, providing access to free, 24/7 support and information via the medium people already use and trust:    Here's how it works:  1.  Text 572-970 from anywhere in the [place], anytime, about any type of crisis.  2.  A live, trained Crisis Counselor receives the text and responds quickly.  3.  The volunteer Crisis Counselor will help you move from a 'hot moment to a cool moment'.    My support system    Clinic Contact:  Phone number:    Contact 1:  Phone number:    Contact 2:  Phone number:    Sabianism/:  Phone number:    Therapist:  Phone number:    Local crisis center:    Phone number:    Other community support:  Phone number:

## 2020-08-03 NOTE — PROGRESS NOTES
"Flower Logan is a 30 year old female who is being evaluated via a billable video visit.      The patient has been notified of following:     \"This video visit will be conducted via a call between you and your physician/provider. We have found that certain health care needs can be provided without the need for an in-person physical exam.  This service lets us provide the care you need with a video conversation.  If a prescription is necessary we can send it directly to your pharmacy.  If lab work is needed we can place an order for that and you can then stop by our lab to have the test done at a later time.    Video visits are billed at different rates depending on your insurance coverage.  Please reach out to your insurance provider with any questions.    If during the course of the call the physician/provider feels a video visit is not appropriate, you will not be charged for this service.\"    Patient has given verbal consent for Video visit? Yes  How would you like to obtain your AVS? MyChart  If you are dropped from the video visit, the video invite should be resent to: Text to cell phone: [phone]  Will anyone else be joining your video visit? No    Subjective     Flower Logan is a 30 year old female who presents today via video visit for the following health issues:    History of Present Illness        Mental Health Follow-up:  Patient presents to follow-up on Depression & Anxiety.Patient's depression since last visit has been:  Better  The patient is not having other symptoms associated with depression.  Patient's anxiety since last visit has been:  Better  The patient is having other symptoms associated with anxiety.  Any significant life events: relationship concerns, job concerns, financial concerns, housing concerns and grief or loss  Patient is feeling anxious or having panic attacks.  Patient has concerns about alcohol or drug use.     Social History  Tobacco Use    Smoking status: Current " Every Day Smoker      Packs/day: 0.00      Years: 10.00      Pack years: 0      Types: Cigarettes    Smokeless tobacco: Never Used  Alcohol use: Yes    Comment: daily; 5-10 drinks  Drug use: No      Today's PHQ-9         PHQ-9 Total Score:     (P) 10   PHQ-9 Q9 Thoughts of better off dead/self-harm past 2 weeks :   (P) Not at all   Thoughts of suicide or self harm:      Self-harm Plan:        Self-harm Action:          Safety concerns for self or others:           She eats 2-3 servings of fruits and vegetables daily.She consumes 2 sweetened beverage(s) daily.She exercises with enough effort to increase her heart rate 10 to 19 minutes per day.  She exercises with enough effort to increase her heart rate 3 or less days per week.   She is taking medications regularly.      Depression and Anxiety Follow-Up    How are you doing with your depression since your last visit? Improved    How are you doing with your anxiety since your last visit?  No change    Are you having other symptoms that might be associated with depression or anxiety? Yes:  insomnia    Have you had a significant life event? Relationship Concerns, Job Concerns and OTHER: ended long term relationship, moved, job stability     Do you have any concerns with your use of alcohol or other drugs? Yes:  alcohol use disorder    Social History     Tobacco Use     Smoking status: Current Every Day Smoker     Packs/day: 0.00     Years: 10.00     Pack years: 0.00     Types: Cigarettes     Smokeless tobacco: Never Used   Substance Use Topics     Alcohol use: Yes     Comment: daily; 5-10 drinks     Drug use: No     PHQ 12/20/2018 7/6/2020 8/3/2020   PHQ-9 Total Score 13 22 10   Q9: Thoughts of better off dead/self-harm past 2 weeks Not at all Several days Not at all   F/U: Thoughts of suicide or self-harm - No -   F/U: Safety concerns - No -     DERIAN-7 SCORE 12/20/2018 7/6/2020 8/3/2020   Total Score - 21 (severe anxiety) 14 (moderate anxiety)   Total Score 21 21 14      Last PHQ-9 8/3/2020   1.  Little interest or pleasure in doing things 1   2.  Feeling down, depressed, or hopeless 1   3.  Trouble falling or staying asleep, or sleeping too much 2   4.  Feeling tired or having little energy 1   5.  Poor appetite or overeating 1   6.  Feeling bad about yourself 1   7.  Trouble concentrating 2   8.  Moving slowly or restless 1   Q9: Thoughts of better off dead/self-harm past 2 weeks 0   PHQ-9 Total Score 10   In the past two weeks have you had thoughts of suicide or self harm? -   Do you have concerns about your personal safety or the safety of others? -     DERIAN-7  8/3/2020   1. Feeling nervous, anxious, or on edge 2   2. Not being able to stop or control worrying 2   3. Worrying too much about different things 2   4. Trouble relaxing 2   5. Being so restless that it is hard to sit still 2   6. Becoming easily annoyed or irritable 2   7. Feeling afraid, as if something awful might happen 2   DERIAN-7 Total Score 14   If you checked any problems, how difficult have they made it for you to do your work, take care of things at home, or get along with other people? -       Suicide Assessment Five-step Evaluation and Treatment (SAFE-T)      How many servings of fruits and vegetables do you eat daily?  2-3    On average, how many sweetened beverages do you drink each day (Examples: soda, juice, sweet tea, etc.  Do NOT count diet or artificially sweetened beverages)?   2    How many days per week do you exercise enough to make your heart beat faster? 3 or less    How many minutes a day do you exercise enough to make your heart beat faster? 10 - 19    How many days per week do you miss taking your medication? 0    Started taking zoloft consistently.  Last time we increased dose to 50 mg.  Insomnia is slightly improved.  Depression feels better.  Anxiety the same (but has always had this).  Takes propranolol every day for panic attack symptoms - has been on this for 7 years.    In past, has  been given hydroxyzine - doesn't take often.  Does help take the edge off a bit.  Doesn't make her too drowsy.    Following up with psychiatrist - somewhat, but would like pcp to manage meds for now if possible.    Still living with parents.  Planning on returning in September.    Care coordinator has reached out - playing phone tag.    Alcohol cravings daily still.  Still drinking daily, but avoiding hard liquor.  5-6 beers/seltzers daily.    Is on wait lists in [place]. - [place] alternatives.  Harm reduction model.      Video Start Time: 12:28 PM      Patient Active Problem List   Diagnosis     Alcohol abuse with other alcohol-induced disorder (H)     Tobacco abuse     DERIAN (generalized anxiety disorder)     No past surgical history on file.    Social History     Tobacco Use     Smoking status: Current Every Day Smoker     Packs/day: 0.00     Years: 10.00     Pack years: 0.00     Types: Cigarettes     Smokeless tobacco: Never Used   Substance Use Topics     Alcohol use: Yes     Comment: daily; 5-10 drinks     Family History   Problem Relation Age of Onset     Family History Negative Mother      Alcoholism Paternal Grandfather      Substance Abuse Paternal Grandfather      Other Cancer Maternal Grandmother      Mental Illness Other          Current Outpatient Medications   Medication Sig Dispense Refill     hydrOXYzine (ATARAX) 25 MG tablet Take 1 tablet (25 mg) by mouth 3 times daily as needed for anxiety 30 tablet 1     sertraline (ZOLOFT) 100 MG tablet Take 1 tablet (100 mg) by mouth daily 60 tablet 0     propranolol (INDERAL) 60 MG tablet Take 60 mg by mouth daily       No Known Allergies    Reviewed and updated as needed this visit by Provider         Review of Systems   All other systems on a 10-point review are negative, unless otherwise noted in HPI        Objective             Physical Exam     GENERAL: Healthy, alert and no distress  EYES: Eyes grossly normal to inspection.  No discharge or erythema, or  obvious scleral/conjunctival abnormalities.  RESP: No audible wheeze, cough, or visible cyanosis.  No visible retractions or increased work of breathing.    SKIN: Visible skin clear. No significant rash, abnormal pigmentation or lesions.  NEURO: Cranial nerves grossly intact.  Mentation and speech appropriate for age.  PSYCH: Mentation appears normal, affect normal/bright, judgement and insight intact, normal speech and appearance well-groomed.      Diagnostic Test Results:  Labs reviewed in Epic        Assessment & Plan     1. DERIAN (generalized anxiety disorder)  2. Moderate episode of recurrent major depressive disorder (H)  Mood and sleep are improving after increasing sertraline to 50 mg daily.  Still anxious, but this is chronic and we also discussed this is confounded by alcohol use.  Would possibly benefit from higher dose - will increase to 100 mg daily.  F/u in 1 month (already scheduled)  - sertraline (ZOLOFT) 100 MG tablet; Take 1 tablet (100 mg) by mouth daily  Dispense: 60 tablet; Refill: 0  - hydrOXYzine (ATARAX) 25 MG tablet; Take 1 tablet (25 mg) by mouth 3 times daily as needed for anxiety  Dispense: 30 tablet; Refill: 1      3. Alcohol abuse with other alcohol-induced disorder (H)  Is self-weaning.  Down to 4-5 beers per day (no more hard liquor).  Not interested in detox or inpatient.  Has been looking at intensive outpatient programs.  Is on a waiting list.  Care coordination has been consulted but has had a hard time getting in touch with the patient.  Until she is back in [place] full time, we may be limited in providing support in this regard.  She plans to move back full time in September, when she is scheduled to come see me.  Will use that opportunity to provide her with supports.       Tobacco Cessation:   reports that she has been smoking cigarettes. She has been smoking about 0.00 packs per day for the past 10.00 years. She has never used smokeless tobacco.      Depression Screening Follow  Up    PHQ 8/3/2020   PHQ-9 Total Score 10   Q9: Thoughts of better off dead/self-harm past 2 weeks Not at all   F/U: Thoughts of suicide or self-harm -   F/U: Safety concerns -     Last PHQ-9 8/3/2020   1.  Little interest or pleasure in doing things 1   2.  Feeling down, depressed, or hopeless 1   3.  Trouble falling or staying asleep, or sleeping too much 2   4.  Feeling tired or having little energy 1   5.  Poor appetite or overeating 1   6.  Feeling bad about yourself 1   7.  Trouble concentrating 2   8.  Moving slowly or restless 1   Q9: Thoughts of better off dead/self-harm past 2 weeks 0   PHQ-9 Total Score 10   In the past two weeks have you had thoughts of suicide or self harm? -   Do you have concerns about your personal safety or the safety of others? -              See Patient Instructions    No follow-ups on file.    Jose A Godinez MD  [facility]      Video-Visit Details    Type of service:  Video Visit    Video End Time:12:50 PM    Originating Location (pt. Location): Home    Distant Location (provider location):  [facility]     Platform used for Video Visit: Doximity    No follow-ups on file.       Jose A Godinez MD          Answers for HPI/ROS submitted by the patient on 8/3/2020   Chronic problems general questions HPI Form  How many servings of fruits and vegetables do you eat daily?: 2-3  On average, how many sweetened beverages do you drink each day (Examples: soda, juice, sweet tea, etc.  Do NOT count diet or artificially sweetened beverages)?: 2  How many minutes a day do you exercise enough to make your heart beat faster?: 10 to 19  How many days a week do you exercise enough to make your heart beat faster?: 3 or less  How many days per week do you miss taking your medication?: 0  Depression/Anxiety: Depression & Anxiety  Status since last visit:: better  Anxiety since last: : better  Other associated symptoms of depression:: No  Other associated symotome: :  Yes  Significant life event: : relationship concerns, job concerns, financial concerns, housing concerns, grief or loss  Anxious:: Yes  Current substance use:: Yes  If you checked off any problems, how difficult have these problems made it for you to do your work, take care of things at home, or get along with other people?: Somewhat difficult  PHQ9 TOTAL SCORE: 10  DERIAN 7 TOTAL SCORE: 14

## 2020-08-04 ASSESSMENT — ANXIETY QUESTIONNAIRES: GAD7 TOTAL SCORE: 14

## 2020-08-04 ASSESSMENT — PATIENT HEALTH QUESTIONNAIRE - PHQ9: SUM OF ALL RESPONSES TO PHQ QUESTIONS 1-9: 10

## 2020-09-24 ENCOUNTER — TRANSFERRED RECORDS (OUTPATIENT)
Dept: HEALTH INFORMATION MANAGEMENT | Facility: CLINIC | Age: 30
End: 2020-09-24

## 2020-11-02 ENCOUNTER — TELEPHONE (OUTPATIENT)
Dept: BEHAVIORAL HEALTH | Facility: CLINIC | Age: 30
End: 2020-11-02

## 2020-11-02 ENCOUNTER — HOSPITAL ENCOUNTER (INPATIENT)
Facility: CLINIC | Age: 30
LOS: 4 days | Discharge: OTHER INSTITUTION | End: 2020-11-06
Attending: FAMILY MEDICINE | Admitting: PSYCHIATRY & NEUROLOGY
Payer: COMMERCIAL

## 2020-11-02 DIAGNOSIS — F10.239 ALCOHOL DEPENDENCE WITH WITHDRAWAL WITH COMPLICATION (H): ICD-10-CM

## 2020-11-02 DIAGNOSIS — Z20.828 CONTACT WITH AND (SUSPECTED) EXPOSURE TO OTHER VIRAL COMMUNICABLE DISEASES: ICD-10-CM

## 2020-11-02 LAB
ALBUMIN SERPL-MCNC: 4.1 G/DL (ref 3.4–5)
ALCOHOL BREATH TEST: 0 (ref 0–0.01)
ALP SERPL-CCNC: 94 U/L (ref 40–150)
ALT SERPL W P-5'-P-CCNC: 60 U/L (ref 0–50)
AMPHETAMINES UR QL SCN: POSITIVE
ANION GAP SERPL CALCULATED.3IONS-SCNC: 10 MMOL/L (ref 3–14)
AST SERPL W P-5'-P-CCNC: 53 U/L (ref 0–45)
BARBITURATES UR QL: NEGATIVE
BASOPHILS # BLD AUTO: 0.1 10E9/L (ref 0–0.2)
BASOPHILS NFR BLD AUTO: 1.1 %
BENZODIAZ UR QL: NEGATIVE
BILIRUB SERPL-MCNC: 1.6 MG/DL (ref 0.2–1.3)
BUN SERPL-MCNC: 11 MG/DL (ref 7–30)
CALCIUM SERPL-MCNC: 9.6 MG/DL (ref 8.5–10.1)
CANNABINOIDS UR QL SCN: NEGATIVE
CHLORIDE SERPL-SCNC: 99 MMOL/L (ref 94–109)
CO2 SERPL-SCNC: 23 MMOL/L (ref 20–32)
COCAINE UR QL: NEGATIVE
CREAT SERPL-MCNC: 0.89 MG/DL (ref 0.52–1.04)
DIFFERENTIAL METHOD BLD: ABNORMAL
EOSINOPHIL # BLD AUTO: 0.1 10E9/L (ref 0–0.7)
EOSINOPHIL NFR BLD AUTO: 0.8 %
ERYTHROCYTE [DISTWIDTH] IN BLOOD BY AUTOMATED COUNT: 12.4 % (ref 10–15)
ETHANOL UR QL SCN: NEGATIVE
GFR SERPL CREATININE-BSD FRML MDRD: 88 ML/MIN/{1.73_M2}
GLUCOSE SERPL-MCNC: 78 MG/DL (ref 70–99)
HCG UR QL: NEGATIVE
HCT VFR BLD AUTO: 44.6 % (ref 35–47)
HGB BLD-MCNC: 14.8 G/DL (ref 11.7–15.7)
IMM GRANULOCYTES # BLD: 0 10E9/L (ref 0–0.4)
IMM GRANULOCYTES NFR BLD: 0.3 %
LYMPHOCYTES # BLD AUTO: 1.1 10E9/L (ref 0.8–5.3)
LYMPHOCYTES NFR BLD AUTO: 14.3 %
MCH RBC QN AUTO: 35.2 PG (ref 26.5–33)
MCHC RBC AUTO-ENTMCNC: 33.2 G/DL (ref 31.5–36.5)
MCV RBC AUTO: 106 FL (ref 78–100)
MONOCYTES # BLD AUTO: 0.7 10E9/L (ref 0–1.3)
MONOCYTES NFR BLD AUTO: 9 %
NEUTROPHILS # BLD AUTO: 6 10E9/L (ref 1.6–8.3)
NEUTROPHILS NFR BLD AUTO: 74.5 %
NRBC # BLD AUTO: 0 10*3/UL
NRBC BLD AUTO-RTO: 0 /100
OPIATES UR QL SCN: NEGATIVE
PLATELET # BLD AUTO: 348 10E9/L (ref 150–450)
POTASSIUM SERPL-SCNC: 3.7 MMOL/L (ref 3.4–5.3)
PROT SERPL-MCNC: 8.9 G/DL (ref 6.8–8.8)
RBC # BLD AUTO: 4.21 10E12/L (ref 3.8–5.2)
SARS-COV-2 RNA SPEC QL NAA+PROBE: NORMAL
SODIUM SERPL-SCNC: 132 MMOL/L (ref 133–144)
SPECIMEN SOURCE: NORMAL
WBC # BLD AUTO: 8 10E9/L (ref 4–11)

## 2020-11-02 PROCEDURE — 80053 COMPREHEN METABOLIC PANEL: CPT | Performed by: FAMILY MEDICINE

## 2020-11-02 PROCEDURE — 99285 EMERGENCY DEPT VISIT HI MDM: CPT | Performed by: FAMILY MEDICINE

## 2020-11-02 PROCEDURE — 81025 URINE PREGNANCY TEST: CPT | Performed by: FAMILY MEDICINE

## 2020-11-02 PROCEDURE — 250N000013 HC RX MED GY IP 250 OP 250 PS 637: Performed by: FAMILY MEDICINE

## 2020-11-02 PROCEDURE — C9803 HOPD COVID-19 SPEC COLLECT: HCPCS | Performed by: FAMILY MEDICINE

## 2020-11-02 PROCEDURE — 80320 DRUG SCREEN QUANTALCOHOLS: CPT | Performed by: FAMILY MEDICINE

## 2020-11-02 PROCEDURE — 82075 ASSAY OF BREATH ETHANOL: CPT | Performed by: FAMILY MEDICINE

## 2020-11-02 PROCEDURE — U0003 INFECTIOUS AGENT DETECTION BY NUCLEIC ACID (DNA OR RNA); SEVERE ACUTE RESPIRATORY SYNDROME CORONAVIRUS 2 (SARS-COV-2) (CORONAVIRUS DISEASE [COVID-19]), AMPLIFIED PROBE TECHNIQUE, MAKING USE OF HIGH THROUGHPUT TECHNOLOGIES AS DESCRIBED BY CMS-2020-01-R: HCPCS | Performed by: FAMILY MEDICINE

## 2020-11-02 PROCEDURE — 128N000004 HC R&B CD ADULT

## 2020-11-02 PROCEDURE — 250N000013 HC RX MED GY IP 250 OP 250 PS 637: Performed by: PSYCHIATRY & NEUROLOGY

## 2020-11-02 PROCEDURE — 80307 DRUG TEST PRSMV CHEM ANLYZR: CPT | Performed by: FAMILY MEDICINE

## 2020-11-02 PROCEDURE — 85025 COMPLETE CBC W/AUTO DIFF WBC: CPT | Performed by: FAMILY MEDICINE

## 2020-11-02 RX ORDER — PROPRANOLOL HCL 60 MG
60 CAPSULE, EXTENDED RELEASE 24HR ORAL DAILY
COMMUNITY

## 2020-11-02 RX ORDER — LOPERAMIDE HCL 2 MG
2 CAPSULE ORAL 4 TIMES DAILY PRN
Status: DISCONTINUED | OUTPATIENT
Start: 2020-11-02 | End: 2020-11-06 | Stop reason: HOSPADM

## 2020-11-02 RX ORDER — MULTIPLE VITAMINS W/ MINERALS TAB 9MG-400MCG
1 TAB ORAL DAILY
Status: DISCONTINUED | OUTPATIENT
Start: 2020-11-03 | End: 2020-11-06 | Stop reason: HOSPADM

## 2020-11-02 RX ORDER — LORAZEPAM 1 MG/1
1 TABLET ORAL ONCE
Status: COMPLETED | OUTPATIENT
Start: 2020-11-02 | End: 2020-11-02

## 2020-11-02 RX ORDER — FOLIC ACID 1 MG/1
1 TABLET ORAL DAILY
Status: DISCONTINUED | OUTPATIENT
Start: 2020-11-03 | End: 2020-11-06 | Stop reason: HOSPADM

## 2020-11-02 RX ORDER — LAMOTRIGINE 100 MG/1
100 TABLET ORAL DAILY
Status: DISCONTINUED | OUTPATIENT
Start: 2020-11-03 | End: 2020-11-06 | Stop reason: HOSPADM

## 2020-11-02 RX ORDER — TRAZODONE HYDROCHLORIDE 50 MG/1
50 TABLET, FILM COATED ORAL
Status: DISCONTINUED | OUTPATIENT
Start: 2020-11-02 | End: 2020-11-06 | Stop reason: HOSPADM

## 2020-11-02 RX ORDER — HYDROXYZINE HYDROCHLORIDE 25 MG/1
25 TABLET, FILM COATED ORAL EVERY 4 HOURS PRN
Status: DISCONTINUED | OUTPATIENT
Start: 2020-11-02 | End: 2020-11-06 | Stop reason: HOSPADM

## 2020-11-02 RX ORDER — ACETAMINOPHEN 325 MG/1
650 TABLET ORAL EVERY 4 HOURS PRN
Status: DISCONTINUED | OUTPATIENT
Start: 2020-11-02 | End: 2020-11-06 | Stop reason: HOSPADM

## 2020-11-02 RX ORDER — LAMOTRIGINE 100 MG/1
150 TABLET ORAL DAILY
COMMUNITY

## 2020-11-02 RX ORDER — LAMOTRIGINE 100 MG/1
TABLET ORAL
COMMUNITY
Start: 2020-10-21 | End: 2020-11-02

## 2020-11-02 RX ORDER — ATENOLOL 50 MG/1
50 TABLET ORAL DAILY PRN
Status: DISCONTINUED | OUTPATIENT
Start: 2020-11-02 | End: 2020-11-06 | Stop reason: HOSPADM

## 2020-11-02 RX ORDER — ONDANSETRON 4 MG/1
4 TABLET, ORALLY DISINTEGRATING ORAL EVERY 6 HOURS PRN
Status: DISCONTINUED | OUTPATIENT
Start: 2020-11-02 | End: 2020-11-06 | Stop reason: HOSPADM

## 2020-11-02 RX ORDER — PROPRANOLOL HCL 60 MG
60 CAPSULE, EXTENDED RELEASE 24HR ORAL DAILY
Status: DISCONTINUED | OUTPATIENT
Start: 2020-11-03 | End: 2020-11-06 | Stop reason: HOSPADM

## 2020-11-02 RX ORDER — ALUMINA, MAGNESIA, AND SIMETHICONE 2400; 2400; 240 MG/30ML; MG/30ML; MG/30ML
30 SUSPENSION ORAL EVERY 4 HOURS PRN
Status: DISCONTINUED | OUTPATIENT
Start: 2020-11-02 | End: 2020-11-06 | Stop reason: HOSPADM

## 2020-11-02 RX ORDER — BISACODYL 10 MG
10 SUPPOSITORY, RECTAL RECTAL DAILY PRN
Status: DISCONTINUED | OUTPATIENT
Start: 2020-11-02 | End: 2020-11-06 | Stop reason: HOSPADM

## 2020-11-02 RX ORDER — LANOLIN ALCOHOL/MO/W.PET/CERES
100 CREAM (GRAM) TOPICAL DAILY
Status: DISCONTINUED | OUTPATIENT
Start: 2020-11-03 | End: 2020-11-06 | Stop reason: HOSPADM

## 2020-11-02 RX ORDER — DIAZEPAM 5 MG
5-20 TABLET ORAL EVERY 30 MIN PRN
Status: DISCONTINUED | OUTPATIENT
Start: 2020-11-02 | End: 2020-11-06 | Stop reason: HOSPADM

## 2020-11-02 RX ADMIN — DIAZEPAM 10 MG: 5 TABLET ORAL at 18:04

## 2020-11-02 RX ADMIN — NICOTINE POLACRILEX 4 MG: 4 GUM, CHEWING BUCCAL at 18:05

## 2020-11-02 RX ADMIN — LORAZEPAM 1 MG: 1 TABLET ORAL at 16:02

## 2020-11-02 ASSESSMENT — MIFFLIN-ST. JEOR: SCORE: 1479.32

## 2020-11-02 NOTE — ED PROVIDER NOTES
"    [facility] EMERGENCY DEPARTMENT ([facility])    11/02/20       History     Chief Complaint   Patient presents with     Addiction Problem     etoh: 10 drinks a day: last drink 0830: no hx seizure: wants detox     HPI  Flower Logan is a 30 year old female with history of generalized anxiety, alcohol abuse who presents seeking alcohol detox.  Patient reports drinking 10 alcoholic drinks/half a liter of vodka a day for the past 3 years, last drink at 0830 this morning.  No history of alcohol withdrawal seizures.  Patient is hoping to get into detox; she was supposed to start [facility] today.  She has had a rule 25 assessment completed and did contact behavioral intake prior to come to the ED. she reports that when she attempts to stop drinking, she feels very anxious, \"shaky inside\", and \"jumpy\".  She has experienced sweating in the morning before she drinks.  Denies any history of DTs or seizures.  Her last drink was approximately 4 and half hours ago.  She has a history of bipolar disorder and states she will occasionally have fleeting suicidal thoughts when feeling anxious.  Denies feeling suicidal at present.  She also used a large amount of cocaine once in the past and used methamphetamine for the first time 3 or 4 days ago.  Has been told in the past that she has the first signs of alcohol related liver injury.  I have reviewed the Medications, Allergies, Past Medical and Surgical History, and Social History in the [facility] system.  Past Medical History:   Diagnosis Date     Depressive disorder 2010       History reviewed. No pertinent surgical history.     Review of your medicines      UNREVIEWED medicines. Ask your doctor about these medicines      Dose / Directions   hydrOXYzine 25 MG tablet  Commonly known as: ATARAX  Used for: DERIAN (generalized anxiety disorder)      Dose: 25 mg  Take 1 tablet (25 mg) by mouth 3 times daily as needed for anxiety  Quantity: 30 tablet  Refills: 1     lamoTRIgine 100 " MG tablet  Commonly known as: LaMICtal      Refills: 0     propranolol 60 MG tablet  Commonly known as: INDERAL  Used for: DERIAN (generalized anxiety disorder)      Dose: 60 mg  Take 1 tablet (60 mg) by mouth daily  Quantity: 90 tablet  Refills: 1            Past medical history, past surgical history, medications, and allergies were reviewed with the patient. Additional pertinent items: None    Family History   Problem Relation Age of Onset     Family History Negative Mother      Alcoholism Paternal Grandfather      Substance Abuse Paternal Grandfather      Other Cancer Maternal Grandmother      Mental Illness Other        Social History     Tobacco Use     Smoking status: Current Every Day Smoker     Packs/day: 0.00     Years: 10.00     Pack years: 0.00     Types: Cigarettes     Smokeless tobacco: Never Used   Substance Use Topics     Alcohol use: Yes     Comment: daily; 5-10 drinks     Social history was reviewed with the patient. Additional pertinent items: None    No Known Allergies    Review of Systems  A complete review of systems was performed with pertinent positives and negatives noted in the HPI, and all other systems negative.    Physical Exam   BP: 121/74  Pulse: 87  Temp: 98  F (36.7  C)  Resp: 16  SpO2: 100 %      Physical Exam  Constitutional:       General: She is not in acute distress.     Appearance: She is not diaphoretic.   HENT:      Head: Atraumatic.      Mouth/Throat:      Pharynx: No oropharyngeal exudate.   Eyes:      General: No scleral icterus.     Pupils: Pupils are equal, round, and reactive to light.   Cardiovascular:      Heart sounds: Normal heart sounds.   Pulmonary:      Effort: No respiratory distress.      Breath sounds: Normal breath sounds.   Abdominal:      General: Bowel sounds are normal.      Palpations: Abdomen is soft.      Tenderness: There is no abdominal tenderness.   Musculoskeletal:         General: No tenderness.   Skin:     General: Skin is warm.      Findings: No  rash.   Neurological:      General: No focal deficit present.      Mental Status: She is oriented to person, place, and time.   Psychiatric:         Attention and Perception: Attention normal.         Mood and Affect: Mood is anxious.         Speech: Speech normal.         Behavior: Behavior normal.         Thought Content: Thought content normal.         Cognition and Memory: Cognition normal.         Judgment: Judgment normal.         ED Course        Procedures                           Results for orders placed or performed during the hospital encounter of 11/02/20 (from the past 24 hour(s))   Alcohol breath test POCT   Result Value Ref Range    Alcohol Breath Test 0.000 0.00 - 0.01   Drug abuse screen 6 urine (chem dep)   Result Value Ref Range    Amphetamine Qual Urine Positive (A) NEG^Negative    Barbiturates Qual Urine Negative NEG^Negative    Benzodiazepine Qual Urine Negative NEG^Negative    Cannabinoids Qual Urine Negative NEG^Negative    Cocaine Qual Urine Negative NEG^Negative    Ethanol Qual Urine Negative NEG^Negative    Opiates Qualitative Urine Negative NEG^Negative   HCG qualitative urine (UPT)   Result Value Ref Range    HCG Qual Urine Negative NEG^Negative   CBC with platelets differential   Result Value Ref Range    WBC 8.0 4.0 - 11.0 10e9/L    RBC Count 4.21 3.8 - 5.2 10e12/L    Hemoglobin 14.8 11.7 - 15.7 g/dL    Hematocrit 44.6 35.0 - 47.0 %     (H) 78 - 100 fl    MCH 35.2 (H) 26.5 - 33.0 pg    MCHC 33.2 31.5 - 36.5 g/dL    RDW 12.4 10.0 - 15.0 %    Platelet Count 348 150 - 450 10e9/L    Diff Method Automated Method     % Neutrophils 74.5 %    % Lymphocytes 14.3 %    % Monocytes 9.0 %    % Eosinophils 0.8 %    % Basophils 1.1 %    % Immature Granulocytes 0.3 %    Nucleated RBCs 0 0 /100    Absolute Neutrophil 6.0 1.6 - 8.3 10e9/L    Absolute Lymphocytes 1.1 0.8 - 5.3 10e9/L    Absolute Monocytes 0.7 0.0 - 1.3 10e9/L    Absolute Eosinophils 0.1 0.0 - 0.7 10e9/L    Absolute Basophils 0.1  0.0 - 0.2 10e9/L    Abs Immature Granulocytes 0.0 0 - 0.4 10e9/L    Absolute Nucleated RBC 0.0    Comprehensive metabolic panel   Result Value Ref Range    Sodium 132 (L) 133 - 144 mmol/L    Potassium 3.7 3.4 - 5.3 mmol/L    Chloride 99 94 - 109 mmol/L    Carbon Dioxide 23 20 - 32 mmol/L    Anion Gap 10 3 - 14 mmol/L    Glucose 78 70 - 99 mg/dL    Urea Nitrogen 11 7 - 30 mg/dL    Creatinine 0.89 0.52 - 1.04 mg/dL    GFR Estimate 88 >60 mL/min/[1.73_m2]    GFR Estimate If Black >90 >60 mL/min/[1.73_m2]    Calcium 9.6 8.5 - 10.1 mg/dL    Bilirubin Total 1.6 (H) 0.2 - 1.3 mg/dL    Albumin 4.1 3.4 - 5.0 g/dL    Protein Total 8.9 (H) 6.8 - 8.8 g/dL    Alkaline Phosphatase 94 40 - 150 U/L    ALT 60 (H) 0 - 50 U/L    AST 53 (H) 0 - 45 U/L   Asymptomatic COVID-19 Virus (Coronavirus) by PCR    Specimen: Nasopharyngeal   Result Value Ref Range    COVID-19 Virus PCR to [facility] - Source Nasopharyngeal     COVID-19 Virus PCR to [facility] - Result       Test received-See reflex to IDDL test SARS CoV2 (COVID-19) Virus RT-PCR              Assessments & Plan (with Medical Decision Making)   30-year-old woman with a 3-year history of daily drinking, who is planning on entering our chemical dependency treatment program.  She has difficulty stopping drinking due to both physical and psychological symptoms.  There is no history of DTs or seizures.  Currently her alcohol level is 0 and she has stable vital signs but is feeling somewhat anxious and sweaty.  Based on the duration and severity of her use there would be some potential risk of medically complicated detox.  Labs consistent with her history of daily drinking, and previous mild alcohol induced liver injury.  Appears medically appropriate for voluntary detox admission.  Was given Ativan for anxiety and restlessness here in the ED.  I have reviewed the nursing notes.    Medications   LORazepam (ATIVAN) tablet 1 mg (has no administration in time range)        I have reviewed the  findings, diagnosis, plan and need for follow up with the patient.    New Prescriptions    No medications on file       Final diagnoses:   Alcohol dependence with withdrawal with complication (H)       11/2/2020   [facility] EMERGENCY DEPARTMENT     Alex Mendoza MD  11/02/20 [id number]

## 2020-11-02 NOTE — PHARMACY-ADMISSION MEDICATION HISTORY
Admission Medication History Completed by Pharmacy    See mSpot Admission Navigator for allergy information, preferred outpatient pharmacy and prior to admission medications.     Medication History Sources:     Prescription fill history ([facility] in [place]) via Epic Surescripts data    Patient (via iPad)     Changes made to PTA medication list (reason):    Added: None    Deleted: None    Changed: propranolol formulation from IR to ER (same dose) per pharmacy fill records    Additional Information:    Lamotrigine started end of Sept 2020, dose titrated and per pt report, has been at 100mg PO daily for the last 1.5 weeks or so.     Prior to Admission medications    Medication Sig Last Dose     hydrOXYzine (ATARAX) 25 MG tablet Take 1 tablet (25 mg) by mouth 3 times daily as needed for anxiety     11/1/2020     lamoTRIgine (LAMICTAL) 100 MG tablet     Take 100 mg by mouth daily 11/2/2020     propranolol ER (INDERAL LA) 60 MG 24 hr capsule     Take 60 mg by mouth daily 11/2/2020       Date completed: 11/02/20    Medication history completed by:   Carito Dougherty, Pharm.D., [facility]  Behavioral Health Inpatient Pharmacist  [facility] ([facility]) Emergency Department  Phone: *[phone] ([facility]) or [phone]

## 2020-11-02 NOTE — TELEPHONE ENCOUNTER
S: [facility] ED MD calling with a 30 yr old female seeking detox from alcohol    B: pt is a 30 yr old female seeking detox from alcohol. Pt has been drinking everyday for 3 years. Pt was up to 1 L a day but parents have helped wean pt down to 10 drinks per day. Pt THERESA was before arrival. Pt was to [facility] but requires detox first. Pt is medically cleared and ambulating. Pt asymptomatic for COVID.   Pt admits to use of meth and cocaine. Pt will receive Ativan in the ED. Pt denies seizures and DTs. Pt has passive SI but no plan or intent.     ALT 60  AST 53    Utox + amphetamines    A: vol    R:  Patient cleared and ready for behavioral bed placement: Yes    Paged provider @ 2:18 pm  APURVA/Montse  Provider requests that we verify the COVID is collected for IP MH   Escalated to  management     Notified unit @ 2:49 pm   Unit will call back   Paged ED @ 2:52 pm

## 2020-11-02 NOTE — ED NOTES
Patient has been drinking since age 17.    For last 3 years her alcohol intake has increased.  She was drinking about 7 hard setlzers plus 5 shots of whiskey daily.    She moved in with her parents and her intake has been about 1/2 L vodka daily.    She was at [facility] today and they sent her here for detox first.     Said she tried methamphetamine 2 days ago for the first time.

## 2020-11-02 NOTE — ED NOTES
.  ED to Behavioral Floor Handoff    SITUATION  Flower Logan is a 30 year old female who speaks English and lives in a home with family members The patient arrived in the ED by private car from home with a complaint of Addiction Problem (etoh: 10 drinks a day: last drink 0830: no hx seizure: wants detox)  .The patient's current symptoms started/worsened 3 year(s) ago and during this time the symptoms have increased.   In the ED, pt was diagnosed with   Final diagnoses:   Alcohol dependence with withdrawal with complication (H)        Initial vitals were: BP: 121/74  Pulse: 87  Temp: 98  F (36.7  C)  Resp: 16  SpO2: 100 %   --------  Is the patient diabetic? No   If yes, last blood glucose? --     If yes, was this treated in the ED? --  --------  Is the patient inebriated (ETOH) No or Impaired on other substances? No  MSSA done? Yes  Last MSSA score: 05  Were withdrawal symptoms treated? No  Does the patient have a seizure history? No. If yes, date of most recent seizure--  --------  Is the patient patient experiencing suicidal ideation? denies current or recent suicidal ideation     Homicidal ideation? denies current or recent homicidal ideation or behaviors.    Self-injurious behavior/urges? denies current or recent self injurious behavior or ideation.  ------  Was pt aggressive in the ED No  Was a code called No  Is the pt now cooperative? Yes  -------  Meds given in ED: Medications - No data to display   Family present during ED course? No  Family currently present? No    BACKGROUND  Does the patient have a cognitive impairment or developmental disability? No  Allergies: No Known Allergies.   Social demographics are   Social History     Socioeconomic History     Marital status: Single     Spouse name: None     Number of children: None     Years of education: None     Highest education level: None   Occupational History     None   Social Needs     Financial resource strain: None     Food insecurity     Worry:  None     Inability: None     Transportation needs     Medical: None     Non-medical: None   Tobacco Use     Smoking status: Current Every Day Smoker     Packs/day: 0.00     Years: 10.00     Pack years: 0.00     Types: Cigarettes     Smokeless tobacco: Never Used   Substance and Sexual Activity     Alcohol use: Yes     Comment: daily; 5-10 drinks     Drug use: No     Sexual activity: Not Currently     Partners: Male     Birth control/protection: Condom   Lifestyle     Physical activity     Days per week: None     Minutes per session: None     Stress: None   Relationships     Social connections     Talks on phone: None     Gets together: None     Attends [employer] service: None     Active member of club or organization: None     Attends meetings of clubs or organizations: None     Relationship status: None     Intimate partner violence     Fear of current or ex partner: None     Emotionally abused: None     Physically abused: None     Forced sexual activity: None   Other Topics Concern     Parent/sibling w/ CABG, MI or angioplasty before 65F 55M? No   Social History Narrative    Masters in clinical psychology.  Stopped doctorate degree - causing too much stress.        ASSESSMENT  Labs results   Labs Ordered and Resulted from Time of ED Arrival Up to the Time of Departure from the ED   CBC WITH PLATELETS DIFFERENTIAL - Abnormal; Notable for the following components:       Result Value     (*)     MCH 35.2 (*)     All other components within normal limits   COMPREHENSIVE METABOLIC PANEL - Abnormal; Notable for the following components:    Sodium 132 (*)     Bilirubin Total 1.6 (*)     Protein Total 8.9 (*)     ALT 60 (*)     AST 53 (*)     All other components within normal limits   DRUG ABUSE SCREEN 6 CHEM DEP URINE ([facility]) - Abnormal; Notable for the following components:    Amphetamine Qual Urine Positive (*)     All other components within normal limits   ALCOHOL BREATH TEST POCT - Normal   HCG QUALITATIVE  URINE   COVID-19 VIRUS (CORONAVIRUS) BY PCR   SARS-COV-2 (COVID-19) VIRUS RT-PCR      Imaging Studies: No results found for this or any previous visit (from the past 24 hour(s)).   Most recent vital signs /55   Pulse 99   Temp 98.4  F (36.9  C) (Oral)   Resp 18   SpO2 98%    Abnormal labs/tests/findings requiring intervention:---   Pain control: pt had none  Nausea control: pt had none    RECOMMENDATION  Are any infection precautions needed (MRSA, VRE, etc.)? No If yes, what infection? --  ---  Does the patient have mobility issues? independently. If yes, what device does the pt use? ---  ---  Is patient on 72 hour hold or commitment? No If on 72 hour hold, have hold and rights been given to patient? N/A  Are admitting orders written if after 10 p.m. ?N/A  Tasks needing to be completed:---     Alissa Funes RN   [facility]--    [phone] [place] [facility]   [phone] [facility]

## 2020-11-02 NOTE — ED NOTES
Patient attempted to void in cup but missed it.  Will give her fluids to drink and a hat to void in for next time

## 2020-11-03 LAB
ALBUMIN SERPL-MCNC: 3.4 G/DL (ref 3.4–5)
ALP SERPL-CCNC: 72 U/L (ref 40–150)
ALT SERPL W P-5'-P-CCNC: 46 U/L (ref 0–50)
ANION GAP SERPL CALCULATED.3IONS-SCNC: 9 MMOL/L (ref 3–14)
AST SERPL W P-5'-P-CCNC: 44 U/L (ref 0–45)
BILIRUB SERPL-MCNC: 0.9 MG/DL (ref 0.2–1.3)
BUN SERPL-MCNC: 12 MG/DL (ref 7–30)
CALCIUM SERPL-MCNC: 9.3 MG/DL (ref 8.5–10.1)
CHLORIDE SERPL-SCNC: 107 MMOL/L (ref 94–109)
CHOLEST SERPL-MCNC: 223 MG/DL
CO2 SERPL-SCNC: 22 MMOL/L (ref 20–32)
CREAT SERPL-MCNC: 0.96 MG/DL (ref 0.52–1.04)
FOLATE SERPL-MCNC: 17.9 NG/ML
GFR SERPL CREATININE-BSD FRML MDRD: 80 ML/MIN/{1.73_M2}
GGT SERPL-CCNC: 128 U/L (ref 0–40)
GLUCOSE SERPL-MCNC: 91 MG/DL (ref 70–99)
HDLC SERPL-MCNC: 59 MG/DL
LABORATORY COMMENT REPORT: NORMAL
LDLC SERPL CALC-MCNC: 129 MG/DL
NONHDLC SERPL-MCNC: 164 MG/DL
POTASSIUM SERPL-SCNC: 4 MMOL/L (ref 3.4–5.3)
PROT SERPL-MCNC: 7.3 G/DL (ref 6.8–8.8)
SARS-COV-2 RNA SPEC QL NAA+PROBE: NEGATIVE
SODIUM SERPL-SCNC: 138 MMOL/L (ref 133–144)
SPECIMEN SOURCE: NORMAL
T4 FREE SERPL-MCNC: 1.13 NG/DL (ref 0.76–1.46)
TRIGL SERPL-MCNC: 174 MG/DL
TSH SERPL DL<=0.005 MIU/L-ACNC: 4.5 MU/L (ref 0.4–4)
VIT B12 SERPL-MCNC: 308 PG/ML (ref 193–986)

## 2020-11-03 PROCEDURE — 80061 LIPID PANEL: CPT | Performed by: PSYCHIATRY & NEUROLOGY

## 2020-11-03 PROCEDURE — 36415 COLL VENOUS BLD VENIPUNCTURE: CPT | Performed by: PSYCHIATRY & NEUROLOGY

## 2020-11-03 PROCEDURE — 128N000004 HC R&B CD ADULT

## 2020-11-03 PROCEDURE — 84439 ASSAY OF FREE THYROXINE: CPT | Performed by: PSYCHIATRY & NEUROLOGY

## 2020-11-03 PROCEDURE — 80053 COMPREHEN METABOLIC PANEL: CPT | Performed by: PSYCHIATRY & NEUROLOGY

## 2020-11-03 PROCEDURE — 250N000013 HC RX MED GY IP 250 OP 250 PS 637: Performed by: PSYCHIATRY & NEUROLOGY

## 2020-11-03 PROCEDURE — 84443 ASSAY THYROID STIM HORMONE: CPT | Performed by: PSYCHIATRY & NEUROLOGY

## 2020-11-03 PROCEDURE — 99223 1ST HOSP IP/OBS HIGH 75: CPT | Mod: AI | Performed by: PSYCHIATRY & NEUROLOGY

## 2020-11-03 PROCEDURE — 99207 PR CONSULT E&M CHANGED TO SUBSEQUENT LEVEL: CPT | Performed by: PHYSICIAN ASSISTANT

## 2020-11-03 PROCEDURE — 82746 ASSAY OF FOLIC ACID SERUM: CPT | Performed by: PSYCHIATRY & NEUROLOGY

## 2020-11-03 PROCEDURE — H2032 ACTIVITY THERAPY, PER 15 MIN: HCPCS

## 2020-11-03 PROCEDURE — 99232 SBSQ HOSP IP/OBS MODERATE 35: CPT | Performed by: PHYSICIAN ASSISTANT

## 2020-11-03 PROCEDURE — H2035 A/D TX PROGRAM, PER HOUR: HCPCS

## 2020-11-03 PROCEDURE — HZ2ZZZZ DETOXIFICATION SERVICES FOR SUBSTANCE ABUSE TREATMENT: ICD-10-PCS | Performed by: PSYCHIATRY & NEUROLOGY

## 2020-11-03 PROCEDURE — 82977 ASSAY OF GGT: CPT | Performed by: PSYCHIATRY & NEUROLOGY

## 2020-11-03 PROCEDURE — 82607 VITAMIN B-12: CPT | Performed by: PSYCHIATRY & NEUROLOGY

## 2020-11-03 RX ORDER — GABAPENTIN 100 MG/1
100 CAPSULE ORAL 3 TIMES DAILY PRN
Status: DISCONTINUED | OUTPATIENT
Start: 2020-11-03 | End: 2020-11-03

## 2020-11-03 RX ORDER — GABAPENTIN 300 MG/1
300 CAPSULE ORAL 3 TIMES DAILY PRN
Status: DISCONTINUED | OUTPATIENT
Start: 2020-11-03 | End: 2020-11-06 | Stop reason: HOSPADM

## 2020-11-03 RX ADMIN — THIAMINE HCL TAB 100 MG 100 MG: 100 TAB at 09:22

## 2020-11-03 RX ADMIN — NICOTINE POLACRILEX 2 MG: 2 GUM, CHEWING BUCCAL at 20:46

## 2020-11-03 RX ADMIN — DIAZEPAM 10 MG: 5 TABLET ORAL at 12:33

## 2020-11-03 RX ADMIN — DIAZEPAM 10 MG: 5 TABLET ORAL at 16:14

## 2020-11-03 RX ADMIN — DIAZEPAM 10 MG: 5 TABLET ORAL at 20:09

## 2020-11-03 RX ADMIN — DIAZEPAM 10 MG: 5 TABLET ORAL at 06:22

## 2020-11-03 RX ADMIN — MULTIPLE VITAMINS W/ MINERALS TAB 1 TABLET: TAB at 09:22

## 2020-11-03 RX ADMIN — DIAZEPAM 10 MG: 5 TABLET ORAL at 09:22

## 2020-11-03 RX ADMIN — FOLIC ACID 1 MG: 1 TABLET ORAL at 09:22

## 2020-11-03 RX ADMIN — LAMOTRIGINE 100 MG: 100 TABLET ORAL at 09:22

## 2020-11-03 RX ADMIN — NICOTINE POLACRILEX 4 MG: 4 GUM, CHEWING BUCCAL at 10:24

## 2020-11-03 RX ADMIN — TRAZODONE HYDROCHLORIDE 50 MG: 50 TABLET ORAL at 22:19

## 2020-11-03 RX ADMIN — PROPRANOLOL HYDROCHLORIDE 60 MG: 60 CAPSULE, EXTENDED RELEASE ORAL at 09:31

## 2020-11-03 RX ADMIN — GABAPENTIN 300 MG: 300 CAPSULE ORAL at 20:12

## 2020-11-03 ASSESSMENT — ACTIVITIES OF DAILY LIVING (ADL)
DRESS: INDEPENDENT
LAUNDRY: WITH SUPERVISION
ORAL_HYGIENE: INDEPENDENT
HYGIENE/GROOMING: INDEPENDENT

## 2020-11-03 NOTE — PROGRESS NOTES
11/02/20 1811   Patient Belongings   Did you bring any home meds/supplements to the hospital?  No   Patient Belongings other (see comments)   Belongings Search No   Clothing Search No   Second Staff Mikayla & Luann   Comment See notes.   Discharge bin: tatiana pin, cell & , Beverley and , social security card2 (2)  Storage bin: note books, pens, hair brush, backpack.   *Patient has clothes and slippers in her room.   A               Admission:  I am responsible for any personal items that are not sent to the safe or pharmacy.  [facility] is not responsible for loss, theft or damage of any property in my possession.    Signature:  _________________________________ Date: _______  Time: _____                                              Staff Signature:  ____________________________ Date: ________  Time: _____      2nd Staff person, if patient is unable/unwilling to sign:    Signature: ________________________________ Date: ________  Time: _____     Discharge:  [facility] has returned all of my personal belongings:    Signature: _________________________________ Date: ________  Time: _____                                          Staff Signature:  ____________________________ Date: ________  Time: _____

## 2020-11-03 NOTE — PLAN OF CARE
Behavioral Team Discussion: (11/3/2020)    Continued Stay Criteria/Rationale: Patient admitted for Chemical Use Issues.  Plan: The following services will be provided to the patient; psychiatric assessment, medication management, therapeutic milieu, individual and group support, and skills groups.   Participants: 3A Provider: Dr. Leo Moise MD; 3A RN's: Janette Chandra RN; 3A CM's: Joana Ken.  Summary/Recommendation: Providers will assess today for treatment recommendations, discharge planning, and aftercare plans. CM will meet with pt for discharge planning.   Medical/Physical: No biomedical concerns noted upon admission  Precautions:   Behavioral Orders   Procedures     Code 1 - Restrict to Unit     Routine Programming     As clinically indicated     Status 15     Every 15 minutes.     Withdrawal precautions     Rationale for change in precautions or plan: N/A  Progress: Initial.

## 2020-11-03 NOTE — PLAN OF CARE
Pt presents with tense affect this morning, though does appear calmer in the afternoon. Pt reported high anxiety and depression this morning, in the context of being inpatient and also withdrawal (pt rates anxiety and depression at 8 out of 10m 10 = worst). Pt denies SI/SIB. Pt's MSSA scores today were 9 and 8, and pt received 10 mg diazepam for each score. Pt is pleasant and cooperative in interactions. No physical concerns today. No other concerns at this time. Nursing will continue to monitor and assess.

## 2020-11-03 NOTE — PROGRESS NOTES
Case Management Note  11/3/2020    Met with pt to discuss discharge planning. Pt had been set to admit to [facility] 11/[facility]/20 but needed to detox. Pt still plans to admit to [facility] following detox. Per Nahid @ [facility], they are holding pt's bed. Pt declines any further resources at this time.     Francesca Toth, [facility], LADC

## 2020-11-03 NOTE — H&P
"Flower Logan is a 30 year old female who was referred by [facility]  CHIEF COMPLAINT: Alcohol    HISTORY OF PRESENT ILLNESS:      Patient had an assessment done and was referred to [facility].  When she came to the [facility] the nurse followed about how she was drinking and sent her to the emergency room  She was evaluated and sent to come to detox  Patient has a 13-year history of abuse. No history of DTs, seizures. Reports two years ago that she had liver damage on abdominal US  Patient has been using the following substances: Alcohol  Started at age 16, became a problem at   3 years ago  She has been drinking 10 alcoholic drinks, half a liter of vodka per day.  Patient has tolerance, withdrawal, progressive use, loss of control, spending more time and more amount than intended. Patient has made attempts to quit, is experiencing cravings, and reports negative consequences.  She has no history of DTs or seizures it is impacted her health relationship scarier she cannot stop drinking    She is smokes marijuana half a pack a day meth she only used it once 3 or 4 days ago  History of using cocaine in the past          Has passive SI \"wharts the point \" no active suicidal ideation plan or intent    Denies auditory or visual hallucinations.       PSYCHIATRIC REVIEW OF SYSTEMS:         Psychiatric Review of Systems:   Depression:    She feels sad worth less isolated lack of sleep lack of energy lack of motivation passive SI no plan or intent no prior attempts  Holly:    Was diagnosed with bipolar 2 she tried Zoloft and it made her become agitated impulsive she was felt all over the place  Not able to clearly delineate euphoric.'s with flight of ideas racing thoughts going for days without sleeping  Psychosis:     Denies: visual hallucinations, auditory hallucinations, paranoia  Anxiety:     excessive worries that are difficult to control for the past 6 months,   Chronic anxiety , not able to stop worrying " impacting sleep, poor conc, irritable , muscle tension    panic attacks sob, heart racing sweaty shaky , nausea     worries that are difficult to control for the past 6 months, panic attacks  PTSD:     Denies: re-experiencing past trauma, nightmares, increased arousal, avoidance of traumatic stimuli, impaired function.  OCD:     Denies: obsessions, checking, symmetry, cleaning, skin picking.  ED:     Denies: restriction, binging, purging.                  PSYCHIATRIC HISTORY     Previous diagnoses: Anxiety depression bipolar      Symptoms in relation to substance use (symptoms present without use?):  Has anxiety that predates her problematic use of alcohol    Past court commitments: none  SIB /SUICIDE ATTEMPTS NONE  Psych Hosp : None  Outpatient Programs none  Inpatient cd trt was in [facility] as described above but started at  Out pt cd trt    PAST PSYCH MED TRIALS   Zoloft      SOCIAL HISTORY                                                                         She is unemployed        Family History:   FAMILY HISTORY:   Family History   Problem Relation Age of Onset     Family History Negative Mother      Alcoholism Paternal Grandfather      Substance Abuse Paternal Grandfather      Other Cancer Maternal Grandmother      Mental Illness Other    Paternal side cousins have bipolar  Paternal grandmother's brother committed suicide           Physical ROS:   The patient endorsed shaky the remainder of 10-point review of systems was negative except as noted in HPI.         PTA Medications:     Medications Prior to Admission   Medication Sig Dispense Refill Last Dose     hydrOXYzine (ATARAX) 25 MG tablet Take 1 tablet (25 mg) by mouth 3 times daily as needed for anxiety 30 tablet 1 11/1/2020     lamoTRIgine (LAMICTAL) 100 MG tablet Take 100 mg by mouth daily   11/2/2020     propranolol ER (INDERAL LA) 60 MG 24 hr capsule Take 60 mg by mouth daily   11/2/2020          Allergies:   No Known Allergies       Labs:      Recent Results (from the past 48 hour(s))   Alcohol breath test POCT    Collection Time: 11/02/20 11:09 AM   Result Value Ref Range    Alcohol Breath Test 0.000 0.00 - 0.01   Drug abuse screen 6 urine (chem dep)    Collection Time: 11/02/20 11:46 AM   Result Value Ref Range    Amphetamine Qual Urine Positive (A) NEG^Negative    Barbiturates Qual Urine Negative NEG^Negative    Benzodiazepine Qual Urine Negative NEG^Negative    Cannabinoids Qual Urine Negative NEG^Negative    Cocaine Qual Urine Negative NEG^Negative    Ethanol Qual Urine Negative NEG^Negative    Opiates Qualitative Urine Negative NEG^Negative   HCG qualitative urine (UPT)    Collection Time: 11/02/20 11:46 AM   Result Value Ref Range    HCG Qual Urine Negative NEG^Negative   CBC with platelets differential    Collection Time: 11/02/20 12:34 PM   Result Value Ref Range    WBC 8.0 4.0 - 11.0 10e9/L    RBC Count 4.21 3.8 - 5.2 10e12/L    Hemoglobin 14.8 11.7 - 15.7 g/dL    Hematocrit 44.6 35.0 - 47.0 %     (H) 78 - 100 fl    MCH 35.2 (H) 26.5 - 33.0 pg    MCHC 33.2 31.5 - 36.5 g/dL    RDW 12.4 10.0 - 15.0 %    Platelet Count 348 150 - 450 10e9/L    Diff Method Automated Method     % Neutrophils 74.5 %    % Lymphocytes 14.3 %    % Monocytes 9.0 %    % Eosinophils 0.8 %    % Basophils 1.1 %    % Immature Granulocytes 0.3 %    Nucleated RBCs 0 0 /100    Absolute Neutrophil 6.0 1.6 - 8.3 10e9/L    Absolute Lymphocytes 1.1 0.8 - 5.3 10e9/L    Absolute Monocytes 0.7 0.0 - 1.3 10e9/L    Absolute Eosinophils 0.1 0.0 - 0.7 10e9/L    Absolute Basophils 0.1 0.0 - 0.2 10e9/L    Abs Immature Granulocytes 0.0 0 - 0.4 10e9/L    Absolute Nucleated RBC 0.0    Comprehensive metabolic panel    Collection Time: 11/02/20 12:34 PM   Result Value Ref Range    Sodium 132 (L) 133 - 144 mmol/L    Potassium 3.7 3.4 - 5.3 mmol/L    Chloride 99 94 - 109 mmol/L    Carbon Dioxide 23 20 - 32 mmol/L    Anion Gap 10 3 - 14 mmol/L    Glucose 78 70 - 99 mg/dL    Urea Nitrogen 11  "7 - 30 mg/dL    Creatinine 0.89 0.52 - 1.04 mg/dL    GFR Estimate 88 >60 mL/min/[1.73_m2]    GFR Estimate If Black >90 >60 mL/min/[1.73_m2]    Calcium 9.6 8.5 - 10.1 mg/dL    Bilirubin Total 1.6 (H) 0.2 - 1.3 mg/dL    Albumin 4.1 3.4 - 5.0 g/dL    Protein Total 8.9 (H) 6.8 - 8.8 g/dL    Alkaline Phosphatase 94 40 - 150 U/L    ALT 60 (H) 0 - 50 U/L    AST 53 (H) 0 - 45 U/L   Asymptomatic COVID-19 Virus (Coronavirus) by PCR    Collection Time: 11/02/20 12:34 PM    Specimen: Nasopharyngeal   Result Value Ref Range    COVID-19 Virus PCR to [facility] - Source Nasopharyngeal     COVID-19 Virus PCR to [facility] - Result       Test received-See reflex to IDDL test SARS CoV2 (COVID-19) Virus RT-PCR   SARS-CoV-2 COVID-19 Virus (Coronavirus) RT-PCR Nasopharyngeal    Collection Time: 11/02/20 12:34 PM    Specimen: Nasopharyngeal   Result Value Ref Range    SARS-CoV-2 Virus Specimen Source Nasopharyngeal     SARS-CoV-2 PCR Result NEGATIVE     SARS-CoV-2 PCR Comment       Testing was performed using the [employer]plex[employer] COVID-19 Direct Assay on the [employer]ig[employer] Liaison MDX   instrument. Additional information about this Emergency Use Authorization (EUA) assay can   be found via the Lab Guide.     GGT    Collection Time: 11/03/20  7:03 AM   Result Value Ref Range     (H) 0 - 40 U/L   Lipid panel    Collection Time: 11/03/20  7:03 AM   Result Value Ref Range    Cholesterol 223 (H) <200 mg/dL    Triglycerides 174 (H) <150 mg/dL    HDL Cholesterol 59 >49 mg/dL    LDL Cholesterol Calculated 129 (H) <100 mg/dL    Non HDL Cholesterol 164 (H) <130 mg/dL   TSH with free T4 reflex and/or T3 as indicated    Collection Time: 11/03/20  7:03 AM   Result Value Ref Range    TSH 4.50 (H) 0.40 - 4.00 mU/L   T4 free    Collection Time: 11/03/20  7:03 AM   Result Value Ref Range    T4 Free 1.13 0.76 - 1.46 ng/dL          Physical and Psychiatric Examination:     /83   Pulse 121   Temp 98  F (36.7  C) (Temporal)   Resp 14   Ht 1.651 m (5' 5\")   Wt " 74.8 kg (165 lb)   SpO2 99%   BMI 27.46 kg/m    Weight is 165 lbs 0 oz  Body mass index is 27.46 kg/m .    Physical Exam:     ROS: 10 point ROS neg other than the symptoms noted above in the HPI.            Past Medical History:   PAST MEDICAL HISTORY:   Past Medical History:   Diagnosis Date     Depressive disorder 2010       PAST SURGICAL HISTORY: History reviewed. No pertinent surgical history.    -    -           MENTAL STATUS EXAM:      Constitutional: General appearance of patient:      Appearance:  awake, alert, appeared as age stated, adequate groomed and slightly unkempt  Attitude:  cooperative  Eye Contact:  good  Mood:   Agitated  Affect:  congruent   Speech:  clear, coherent normal rate   Psychomotor Behavior:  no evidence of tardive dyskinesia, dystonia, or tics  Thought Process:  logical, linear and goal oriented  Associations:  no loose associations  Thought Content:  no evidence of psychotic thought and active suicidal ideation present  Denied any active suicidal /homicidation ideation plan intent   Insight:  fair  Judgment:  fair  Oriented to:  time, person, and place  Attention Span and Concentration:  intact  Recent and Remote Memory:  intact  Language:  english with appropriate syntax and vocabulary  Fund of Knowledge: appropriate  Muscle Strength and Tone: normal  Gait and Station: Normal     There are no abnormal or psychotic thoughts, no preoccupations, no overvalued ideas, no rumination, no obsessions, no compulsions, no somatic concerns, no hypochrondriasis, no ideas of reference, and no delusions.  Patient denies homicidal thoughts.   Patient denies suicidal thoughts.  Patient appears to have good judgment and good insight.     Musculoskeletal: Patient shows no abnormalities of motor activity: there is no tremor, no tic, and no dystonia.  There is no apparent muscle atrophy, strength and tone appear normal, and there are no abnormal movements.  Patient has normal gait and  stance.    DISCUSSION:         Assessment:   Inpatient psychiatric hospitalization is warranted at this time for safety, stabilization, and possible adjustment in medications.          Diagnoses:    Alcohol use disorder severe  Alcohol withdrawal severe  Passive suicidal ideation  Major depressive disorder recurrent severe without psychosis  Generalized anxiety disorder  History of panic disorder  Rule out bipolar affective disorder  Nicotine use disorders smokes half a pack a day  Methamphetamine use disorder mild            Plan:   Psychiatric treatment/inteventions:  Medications:    Patient will detox of alcohol using MSSA protocol and Valium patient has elevated pulse 105 tremor shaky sweaty nauseous agitations she scored 9 on MSSA and required 10 mg of Valium since morning  Since admission says required 30 mg of Valium    For her mood swings agitation patient will be continued on lamotrigine 100 mg    Further anxiety she will be started gabapentin 300  3 times a day as needed  Laboratory/Imaging:  Patient has elevated liver enzymes as below transaminases most likely from alcoholism nonviral suspected  Elevated bilirubin 1.6 most likely from alcoholism   Liver Function Studies -   Recent Labs   Lab Test 11/02/20  1234   PROTTOTAL 8.9*   ALBUMIN 4.1   BILITOTAL 1.6*   ALKPHOS 94   AST 53*   ALT 60*      Last Comprehensive Metabolic Panel:  Sodium   Date Value Ref Range Status   11/02/2020 132 (L) 133 - 144 mmol/L Final     Potassium   Date Value Ref Range Status   11/02/2020 3.7 3.4 - 5.3 mmol/L Final     Chloride   Date Value Ref Range Status   11/02/2020 99 94 - 109 mmol/L Final     Carbon Dioxide   Date Value Ref Range Status   11/02/2020 23 20 - 32 mmol/L Final     Anion Gap   Date Value Ref Range Status   11/02/2020 10 3 - 14 mmol/L Final     Glucose   Date Value Ref Range Status   11/02/2020 78 70 - 99 mg/dL Final     Urea Nitrogen   Date Value Ref Range Status   11/02/2020 11 7 - 30 mg/dL Final      Creatinine   Date Value Ref Range Status   11/02/2020 0.89 0.52 - 1.04 mg/dL Final     GFR Estimate   Date Value Ref Range Status   11/02/2020 88 >60 mL/min/[1.73_m2] Final     Comment:     Non  GFR Calc  Starting 12/18/2018, serum creatinine based estimated GFR (eGFR) will be   calculated using the Chronic Kidney Disease Epidemiology Collaboration   (CKD-EPI) equation.       Calcium   Date Value Ref Range Status   11/02/2020 9.6 8.5 - 10.1 mg/dL Final     Bilirubin Total   Date Value Ref Range Status   11/02/2020 1.6 (H) 0.2 - 1.3 mg/dL Final     Alkaline Phosphatase   Date Value Ref Range Status   11/02/2020 94 40 - 150 U/L Final     ALT   Date Value Ref Range Status   11/02/2020 60 (H) 0 - 50 U/L Final     AST   Date Value Ref Range Status   11/02/2020 53 (H) 0 - 45 U/L Final                Hyponatremia slightly low sodium 129 but patient tolerating oral fluids we will put internal medicine consult  Patient will be treated in therapeutic milieu with appropriate individual and group therapies as described.     Medical treatment/interventions:  Medical concerns:   - Consults: IM consult placed. Appreciate assistance.     Legal Status: Voluntary     Safety Assessment:   Checks: Status 15  Pt has not required locked seclusion or restraints in the past 24 hours to maintain safety, please refer to RN documentation for further details.    The risks, benefits, alternatives and side effects have been discussed and are understood by the patient.     Disposition: Pending clinical stabilization. Pt does  appear interested in going back to CD treatment      Patient has been unable to stop using drugs in the community due to both physical and psychological symptoms.  Continued use will put the patient at risk for medical and/or psychiatric complications.    I HAVE REVIEWED LABS WITH PT AND TALKED ABOUT RESULTS WITH PT  I HAVE REVIEWED AND SUMMARIZED OLD RECORDS including his medication reconcilation of his  home medications  and PDMP   I HAVE SPOKEN WITH RN ABOUT MEDICATIONS AND DETOX SCORES  I HAVE SPOKEN WITH CM ABOUT PTS TREATMETN OPTIONS

## 2020-11-03 NOTE — CONSULTS
Internal Medicine Consult - Initial Visit       Flower Logan MRN# [id number]   YOB: 1990 Age: 30 year old   Date of Admission: 11/2/2020  PCP: Ghazal Godinez Mai    Referring Provider: Leo Moise MD  Reason for Visit: Alcohol Withdrawal         Assessment and Recommendations:   30 year old female with a history of Bipolar Disorder, ETOH addiction and tobacco dependence (10 year history) admitted to inpatient psychiatry on 11/2/20 for alcohol detox.     #Alcohol Withdrawal:  Patient has a 13-year history of abuse. No history of DTs, seizures. Reports two years ago that she had liver damage on abdominal US. Would like to detox and try treatment inpatient.    -Agree with [facility] protocol, management per Psych  -Agree with thiamine, folic acid and multivitamins per Psych  -Agree with starting Gabapentin per Psych for related agitation, restlessness    #Bipolar Disorder  #Generalized Anxiety Disorder: Patient currently follows with a PA in [place] for medication management and has been on Lamictal (discontinued Zoloft a couple months ago) and atarax PRN. States she tried a therapy session about a month ago with a behavioral therapist and it wasn't a good fit.   -Management per psychiatry     #Panic Disorder:Continue PTA Propanolol XL 60 mg daily. Management per psychiatry     #Sinus tachycardia: Heart rate 120s this AM. Regular rate on exam. Likely due to missing propanolol dose and withdrawal from alcohol. Recommend resuming PTA propanolol. Please Notify medicine if HR persistently >120     #Hyponatremia, resolved: 132, mild. Likely due to dehydration. Normalized on repeat labs. Encourage PO hydration.     #Tranaminitis, resolved:  ALT elevated 60, AST elevated to 53. T bili 1.6. Likely secondary to chronic alcohol use. Denies any abdominal pain. Recommend alcohol cessation. Repeat CMP, with normalization LFTs.     #Euthyroid sick syndrome: TSH mildly elevated at 4.50 with normal T4 1.13.  Repeat TSH in 4-6 weeks with PCP .     #Methamphetamine use: Sobriety counseling and management per psychiatry     #Tobacco Dependence (10 year history): Agree with Nicorette gum PRN. Cessation counseling.     Medicine will sign off. No further recommendations at this time. Please page the on-call [facility] for any intercurrent medical issues which arise.     Ping Sena PA-C  Hospitalist Service  [phone]         Reason for Visit:   Alcohol Withdrawal         History of Present Illness:   History is obtained from the patient and medical record.     This patient is a 30 year old female with a history of Bipolar Disorder, ETOH addiction and tobacco dependence (10 year history) admitted here from the ED 11/2/20 for alcohol withdrawal. The patient reports a 13 year history of alcohol abuse, that started with partying in high school and throughout college, then escalating up to about 1/2 L daily as of three years ago. She denies history of seizures, DTs, hallucinations. She states she presented for an evaluation at [facility] women's treatment program yesterday morning, which represents her first time seeking treatment. However, she was intoxicated and directed to the ED for alcohol withdrawal treatment.  She mentions that she was told a couple years ago by a medical provider that an ultrasound showed that her liver was damaged from her alcohol use. She expresses she wants to detox and go through rehabilitation. Flower states she also snorted and ingesting meth for the first time about four days ago, which she got from a friend's friend. She also mentions two times in her life when she has experimented with cocaine. Denies any Hx of IVDU. Here this morning, Flower states she feels anxious, restless and depressed. She mentions some muscle twitching, but no active tremors. She denies headache, sweating, chest pain, palpitations, shortness of breath, nausea, vomiting, abdominal pain.     Flower states she has  struggled with depression and anxiety throughout her life. She had been on Zoloft, but reports a Psych professional felt she has Bipolar Disorder and that the Zoloft may have triggered reddy earlier this summer. She was switched over to Lamictal starting at 25mg about 8 weeks ago and has titrated up to 100mg daily. She feels it has helped calm down her rapid cycling and impulsivity. She shares that she does not know herself at all apart from her drinking, so is unsure how much of her psychiatric symptoms are related to her alcohol abuse and how much are related to true Bipolar Disorder. She mentions Flower reports she was pursuing a doctorate in behavioral health, but her alcohol use and mental health issues got in the way. She has been unemployed and living with her parents in [place] since breaking up with her partner after 8 years together. She shares that her alcohol use has been her way of coping with all of these life problems.     Denies any F/C, CP, SOB, N/V/D, abdominal pain, dysuria, hematuria, vaginal discharge, or rashes. Endorsed some palpitations, but feels its due to not taking her propanolol this AM. LMP 2 weeks ago. Denies any active SI/HI.             Review of Systems:   A 10 point ROS was performed and negative unless otherwise noted in HPI.           Past Medical History:   Reviewed and updated in Epic.  Past Medical History:   Diagnosis Date     Depressive disorder 2010   Was diagnosed with Bipolar by a Psych associate in [place] this summer          Past Surgical History:   Reviewed and updated in Epic.  Past Surgical History:   Procedure Laterality Date     No past surgical history                Social History:   Reviewed and updated in [employer].  Social History     Socioeconomic History     Marital status: Single     Spouse name: Not on file     Number of children: Not on file     Years of education: Not on file     Highest education level: Not on file   Occupational History     Not on  file   Social Needs     Financial resource strain: Not on file     Food insecurity     Worry: Not on file     Inability: Not on file     Transportation needs     Medical: Not on file     Non-medical: Not on file   Tobacco Use     Smoking status: Current Every Day Smoker     Packs/day: 0.00     Years: 10.00     Pack years: 0.00     Types: Cigarettes     Smokeless tobacco: Never Used   Substance and Sexual Activity     Alcohol use: Yes     Comment: daily; 5-10 drinks     Drug use: No     Sexual activity: Not Currently     Partners: Male     Birth control/protection: Condom   Lifestyle     Physical activity     Days per week: Not on file     Minutes per session: Not on file     Stress: Not on file   Relationships     Social connections     Talks on phone: Not on file     Gets together: Not on file     Attends Congregational service: Not on file     Active member of club or organization: Not on file     Attends meetings of clubs or organizations: Not on file     Relationship status: Not on file     Intimate partner violence     Fear of current or ex partner: Not on file     Emotionally abused: Not on file     Physically abused: Not on file     Forced sexual activity: Not on file   Other Topics Concern     Parent/sibling w/ CABG, MI or angioplasty before 65F 55M? No   Social History Narrative    Masters in clinical psychology.  Stopped doctorate degree - causing too much stress.              Family History:   Reviewed and updated in Epic.  Family History   Problem Relation Age of Onset     Family History Negative Mother      Alcoholism Paternal Grandfather      Substance Abuse Paternal Grandfather      Other Cancer Maternal Grandmother      Mental Illness Other    Additionally, states several family members on her dad's side, including cousin, great aunt and uncle suffer from Bipolar Disorder.           Allergies:   No Known Allergies          Medications:     Current Facility-Administered Medications   Medication      "acetaminophen (TYLENOL) tablet 650 mg     alum & mag hydroxide-simethicone (MAALOX  ES) suspension 30 mL     atenolol (TENORMIN) tablet 50 mg     bisacodyl (DULCOLAX) Suppository 10 mg     diazepam (VALIUM) tablet 5-20 mg     folic acid (FOLVITE) tablet 1 mg     gabapentin (NEURONTIN) capsule 300 mg     hydrOXYzine (ATARAX) tablet 25 mg     lamoTRIgine (LaMICtal) tablet 100 mg     loperamide (IMODIUM) capsule 2 mg     magnesium hydroxide (MILK OF MAGNESIA) suspension 30 mL     multivitamin w/minerals (THERA-VIT-M) tablet 1 tablet     nicotine (NICORETTE) gum 2-4 mg     ondansetron (ZOFRAN-ODT) ODT tab 4 mg     propranolol ER (INDERAL LA) 24 hr capsule 60 mg     thiamine (B-1) tablet 100 mg     traZODone (DESYREL) tablet 50 mg            Physical Exam:   Blood pressure 111/70, pulse 116, temperature 97.7  F (36.5  C), temperature source Temporal, resp. rate 16, height 1.651 m (5' 5\"), weight 74.8 kg (165 lb), SpO2 99 %.  Body mass index is 27.46 kg/m .  GENERAL: Adult female sitting up in bed. Appears anxious. No acute distress.  HEENT: Anicteric sclera. EOMI. MMM.   CV: Tachycardic rate, regular rhythm. S1, S2. No murmurs appreciated.   RESPIRATORY: Non-labored breathing. Lungs CTAB with no wheezing, rales, rhonchi.   GI: Normoactive bowel sounds. Abdomen soft and non distended. No tenderness, rebound, guarding.   MUSCULOSKELETAL: No joint swelling or tenderness. Moves all extremities.   NEUROLOGICAL: Alert and oriented x 3. No focal deficits. Strength 5/5 bilaterally in upper and lower extremities. No tremors. Cranial Nerves II - XII grossly intact. Steady gait.   EXTREMITIES: No peripheral edema. No calf asymmetry or erythema.   SKIN: Intact. Warm and dry. No jaundice. No rashes.   PSYCH: Rapid speech. Anxious but appropriate affect. Denies SI/HI.           Data:   I personally reviewed the following studies:  ROUTINE IP LABS (Last four results)  CMP   Recent Labs   Lab 11/03/20  0703 11/02/20  1234    132* "   POTASSIUM 4.0 3.7   CHLORIDE 107 99   CO2 22 23   ANIONGAP 9 10   GLC 91 78   BUN 12 11   CR 0.96 0.89   JEVON 9.3 9.6   PROTTOTAL 7.3 8.9*   ALBUMIN 3.4 4.1   BILITOTAL 0.9 1.6*   ALKPHOS 72 94   AST 44 53*   ALT 46 60*     CBC   Recent Labs   Lab 11/02/20  1234   WBC 8.0   RBC 4.21   HGB 14.8   HCT 44.6   *   MCH 35.2*   MCHC 33.2   RDW 12.4          Unresulted Labs Ordered in the Past 30 Days of this Admission     No orders found for last 31 day(s).

## 2020-11-04 PROCEDURE — 250N000013 HC RX MED GY IP 250 OP 250 PS 637: Performed by: PSYCHIATRY & NEUROLOGY

## 2020-11-04 PROCEDURE — 93010 ELECTROCARDIOGRAM REPORT: CPT | Performed by: INTERNAL MEDICINE

## 2020-11-04 PROCEDURE — 99232 SBSQ HOSP IP/OBS MODERATE 35: CPT | Mod: 95 | Performed by: PSYCHIATRY & NEUROLOGY

## 2020-11-04 PROCEDURE — 99231 SBSQ HOSP IP/OBS SF/LOW 25: CPT | Performed by: PHYSICIAN ASSISTANT

## 2020-11-04 PROCEDURE — 93005 ELECTROCARDIOGRAM TRACING: CPT

## 2020-11-04 PROCEDURE — 128N000004 HC R&B CD ADULT

## 2020-11-04 RX ADMIN — THIAMINE HCL TAB 100 MG 100 MG: 100 TAB at 09:12

## 2020-11-04 RX ADMIN — ATENOLOL 50 MG: 50 TABLET ORAL at 10:02

## 2020-11-04 RX ADMIN — FOLIC ACID 1 MG: 1 TABLET ORAL at 09:11

## 2020-11-04 RX ADMIN — LAMOTRIGINE 100 MG: 100 TABLET ORAL at 09:11

## 2020-11-04 RX ADMIN — DIAZEPAM 10 MG: 5 TABLET ORAL at 09:11

## 2020-11-04 RX ADMIN — PROPRANOLOL HYDROCHLORIDE 60 MG: 60 CAPSULE, EXTENDED RELEASE ORAL at 09:11

## 2020-11-04 RX ADMIN — DIAZEPAM 5 MG: 5 TABLET ORAL at 16:16

## 2020-11-04 RX ADMIN — GABAPENTIN 300 MG: 300 CAPSULE ORAL at 13:31

## 2020-11-04 RX ADMIN — HYDROXYZINE HYDROCHLORIDE 25 MG: 25 TABLET, FILM COATED ORAL at 12:59

## 2020-11-04 RX ADMIN — NICOTINE POLACRILEX 4 MG: 2 GUM, CHEWING BUCCAL at 20:04

## 2020-11-04 RX ADMIN — TRAZODONE HYDROCHLORIDE 50 MG: 50 TABLET ORAL at 22:27

## 2020-11-04 RX ADMIN — GABAPENTIN 300 MG: 300 CAPSULE ORAL at 22:27

## 2020-11-04 RX ADMIN — MULTIPLE VITAMINS W/ MINERALS TAB 1 TABLET: TAB at 09:11

## 2020-11-04 ASSESSMENT — ACTIVITIES OF DAILY LIVING (ADL)
LAUNDRY: WITH SUPERVISION
DRESS: STREET CLOTHES
HYGIENE/GROOMING: INDEPENDENT
ORAL_HYGIENE: INDEPENDENT

## 2020-11-04 NOTE — PLAN OF CARE
"\"I woke up and I'm like I'm here and it just triggered something\" \"depression\" \"and feeling disoriented which triggered anxiety, anxious about what's coming next\" regarding treatment. Anxiety rated 10 of 10 and depression rated 8 of 10 in severity. Pt is on scheduled propanolol and that was administered. Pt MSSA score a 13 (mainly d/t pulse) and 10 mg po valium administered. Pt extremely tachycardic this morning with pulse being 121 during am vitals and recheck during nursing assessment and found to be 145. Updated internal medicine Ping and stat EKG was performed (pulse 112 per EKG result). Pt was stating some light headedness. Fall precautions initiated and pt instructed to go from sitting to standing slowly and to walk closer to the side of the heard should any need to grab the hand rail arise. Pt agreeable. Since pulse dropped to 112 will hold off on administering prn atenolol for now and reassess vitals in 1 hour.    Addendum 1221pm:  Pt remained tachycardic and received prn atenolol x 1 at 1002. Noon vitals pulse 98 at 1138. Pt states feeling \"much better\" and light headed feeling has resolved. MSSA score is a 5. Will continue to monitor    Vitals:    11/04/20 0837 11/04/20 0900 11/04/20 1002 11/04/20 1138   BP: 116/81  96/65 111/75   Pulse: 121 145 114 98   Resp: 16   16   Temp: 97.4  F (36.3  C)   97.9  F (36.6  C)   TempSrc: Temporal   Temporal   SpO2: 99%      Weight:       Height:             "

## 2020-11-04 NOTE — PROGRESS NOTES
"Subjective:   Flower Logan is a 30 year old female with a history of Bipolar Disorder, ETOH addiction and tobacco dependence (10 year history) admitted to inpatient psychiatry on 11/2/20 for alcohol detox. Medicine was asked to evaluate for patient for tachycardia to 140s.     Patient states she is feeling extremely anxious this morning, and continues to feel like she is withdrawing. Endorsing racing palpitations and dizziness upon standing. Denies any SOB or CP, pleurisy, calf pain or swelling. States she has been eating and drinking normally. Denies any personal or family hx of arrhythmias like SVT or blood clots.  Patient just took her dose of propanolol prior to evaluation.     Objective:  BP 96/65   Pulse 114   Temp 97.4  F (36.3  C) (Temporal)   Resp 16   Ht 1.651 m (5' 5\")   Wt 74.8 kg (165 lb)   SpO2 99%   BMI 27.46 kg/m    General: Alert, interactive, NAD. Appears anxious.   HEENT: Sclera anicteric, EOMI. MMM.  Neck: Supple, trachea midline.   Resp: Lungs CTA, no wheezing, rales, or rhonchi.   Cardiac: S1, S2, Tachycardic rate and regular rhythm, no murmur  Extremities: No LE edema. No calf asymmetry, erythema, or tenderness.   Skin: Warm and dry, no generalized jaundice or acute rash    Labs/diagnostics:   EKG: Sinus tachycardia at 112 bpm. Normal axis. QTc 442. No ST elevations or depression. No acute ischemia.     Assessment and plan:    #Sinus tachycardia  Sinus tachycardia likely due to anxiety and withdrawal, and possibly due to not getting propanolol yet this AM.   -Continue with oral hydration  -Treatment of anxiety  -Treatment of alcohol withdrawal  -Continue with propanolol 60 mg daily   -Please notify medicine if SBP >120     Medicine will sign off. No further recommendations at this time. Please page the on-call [facility] for any intercurrent medical issues which arise.     Ping Sena PA-C  Hospitalist Service  [phone]        "

## 2020-11-04 NOTE — PLAN OF CARE
Problem: General Rehab Plan of Care  Goal: Therapeutic Recreation/Music Therapy Goal  Description: The patient and/or their representative will achieve their patient-specific goals related to the plan of care.  The patient-specific goals include: Leisure Participation and Healthy Coping Strategies  Outcome: No Change     Pt actively participated in a structured Therapeutic Recreation group with a focus on leisure participation, stress reduction, and social engagement via an active group game. Pt remained focused and engaged throughout full duration of group.  Pt mood was calm and was appropriate with interactions.  Appropriately shared sense of humor with peers during the group and appeared to brighten with social interaction.

## 2020-11-04 NOTE — PROVIDER NOTIFICATION
Pt pulse with am vitals 121, rechecked with portable pulse oximeter and was 145. Updated Ping with internal medicine who ordered an EKG stat.

## 2020-11-04 NOTE — PROGRESS NOTES
Pt given information from tigist on demand for bipolar disorder. Informed her to read through it and if she has any questions to ask any RN or Dr. Moise tomorrow.

## 2020-11-04 NOTE — PROGRESS NOTES
[facility], [place]   Psychiatric Progress Note  TTelemedicine Visit: The patient's condition can be safely assessed and treated via synchronous audio and visual telemedicine encounter.   Start Time: 8.39  Stop Time: 8.49  Reason for Telemedicine Visit: Covid-19   Originating Site (Patient Location): [facility]  Distant Site (Provider Location): Provider Remote Setting   Consent: The patient/guardian has verbally consented to: the potential risks and benefits of telemedicine (video visit) versus in person care; bill my insurance or make self-payment for services provided; and responsibility for payment of non-covered services.   Mode of Communication: Video Conference via [employer]  As the provider I attest to compliance with applicable laws and regulations related to telemedicine.       The patient/guardian has been notified of the following:   This telemedicine visit is conducted live between you and your clinician. We have found that certain health care needs can be provided without the need for a physical exam. This service lets us provide the care you need with a telemedicine conversation.        Interim history   This is a 30 year old female with Pt seen in rounds.   The patient's care was discussed with the treatment team during the daily team meeting and/or staff's chart notes were reviewed.  Staff report patient has been visible in the milieu,  no acute eventsovernight.         Patient reports she feels chills nauseous anxious restless on edge  She did sleep well her appetite is good her energy and motivation are low  Denied suicidal/homicidal ideation/plan intent denied.psychosis  No prior suicde attempts  No access to gun  Pt is in alcohol withdrawal still being monitered every 4 hrs for it,   Pt mssa score are monitered  Tolerating meds and has no side effects.              Medications:     Current Facility-Administered Medications   Medication     acetaminophen (TYLENOL)  "tablet 650 mg     alum & mag hydroxide-simethicone (MAALOX  ES) suspension 30 mL     atenolol (TENORMIN) tablet 50 mg     bisacodyl (DULCOLAX) Suppository 10 mg     diazepam (VALIUM) tablet 5-20 mg     folic acid (FOLVITE) tablet 1 mg     gabapentin (NEURONTIN) capsule 300 mg     hydrOXYzine (ATARAX) tablet 25 mg     lamoTRIgine (LaMICtal) tablet 100 mg     loperamide (IMODIUM) capsule 2 mg     magnesium hydroxide (MILK OF MAGNESIA) suspension 30 mL     multivitamin w/minerals (THERA-VIT-M) tablet 1 tablet     nicotine (NICORETTE) gum 2-4 mg     ondansetron (ZOFRAN-ODT) ODT tab 4 mg     propranolol ER (INDERAL LA) 24 hr capsule 60 mg     thiamine (B-1) tablet 100 mg     traZODone (DESYREL) tablet 50 mg             Allergies:   No Known Allergies         Psychiatric Examination:   Blood pressure 116/81, pulse 121, temperature 97.4  F (36.3  C), temperature source Temporal, resp. rate 16, height 1.651 m (5' 5\"), weight 74.8 kg (165 lb), SpO2 99 %.  Weight is 165 lbs 0 oz  Body mass index is 27.46 kg/m .    Appearance:  awake, alert and adequately groomed  Attitude:  cooperative  Eye Contact:  good  Mood:  anxious  Affect:  appropriate and in normal range  Speech:  clear, coherent rate /rhythm are good  Psychomotor Behavior:  no evidence of tardive dyskinesia, dystonia, or tics and intact station, gait and muscle tone  Throught Process:  logical  Associations:  no loose associations  Thought Content:  no evidence of suicidal ideation or homicidal ideation, no evidence of psychotic thought, no auditory hallucinations present and no visual hallucinations present  Insight:  good  Judgement:  intact  Oriented to:  time, person, and place  Attention Span and Concentration:  intact  Recent and Remote Memory:  intact  Language fund of knowledge are adequate         Labs:     No results found for: NTBNPI, NTBNP  Lab Results   Component Value Date    WBC 8.0 11/02/2020    HGB 14.8 11/02/2020    HCT 44.6 11/02/2020     " (H) 11/02/2020     11/02/2020     Lab Results   Component Value Date    TSH 4.50 (H) 11/03/2020         DX   Alcohol use disorder severe  Alcohol withdrawal severe  Passive suicidal ideation  Major depressive disorder recurrent severe without psychosis  Generalized anxiety disorder  History of panic disorder  Rule out bipolar affective disorder  Nicotine use disorders smokes half a pack a day  Methamphetamine use disorder mild      PLAN   Alcohol intoxication/withdrawal, presently is on MSSA protocol with Valium. Continue the same MSSA protocol as ordered. Continue thiamine 100 mg p.o. daily, M.V.I. one p.o. daily and folate 1 mg p.o. Daily  Will continue mssa protocal to detox off alcohol on valium,  Pt is c/o of termor , agitation poor sleep and poor appetite, he has sweats, feels shakey  On mssa client scored scored 5 today and  needed needed 0 mg po as of yet , total dose since admission was 70    MSSA    Eating Disturbances: ate and enjoyed all of it or not applicable  Tremor: 0 - no tremor  Sleep Disturbance: slept through the night or not applicable  Clouding of Sensorium: no evidence  Hallucinations: 0 - none  Quality of Contact: 0 - awareness of examiner and people around him/her  Agitation: 0 - normal activity  Paroxysmal Sweats: 1 - barely perceptible sweating  Temperature: 99.5 or below  Pulse: 3 - 90 to 99  Total MSSA Score: 5  Nicotine cessation counseling given    For her depression and mood swings she will be continue lamotrigine 100 mg and gabapentin 300 mg 3 times a day as needed  Patient was advised to utilize the gabapentin for anxiety she has not been doing this  Laboratory/Imaging: reviewed with patient   Consults: internal medicine consult reviewed  Patient will be treated in therapeutic milieu with appropriate individual and group therapies as described.  PDMP CHECKED     Supportive psychotheraoy provided, karan talked about recovery enviroment, relapse prevention, triggers to  use.  Discussed with patient many issues of addiction,triggers, relapse, and establishing a solid recovery program.  Asked pt to be med complinat   Medical diagnoses to be addressed this admission:    Plan:         Assessment and Recommendations:   30 year old female with a history of Bipolar Disorder, ETOH addiction and tobacco dependence (10 year history) admitted to inpatient psychiatry on 11/2/20 for alcohol detox.      #Alcohol Withdrawal:  Patient has a 13-year history of abuse. No history of DTs, seizures. Reports two years ago that she had liver damage on abdominal US. Would like to detox and try treatment inpatient.    -Agree with [facility] protocol, management per Psych  -Agree with thiamine, folic acid and multivitamins per Psych  -Agree with starting Gabapentin per Psych for related agitation, restlessness     #Bipolar Disorder  #Generalized Anxiety Disorder: Patient currently follows with a PA in [place] for medication management and has been on Lamictal (discontinued Zoloft a couple months ago) and atarax PRN. States she tried a therapy session about a month ago with a behavioral therapist and it wasn't a good fit.   -Management per psychiatry      #Panic Disorder:Continue PTA Propanolol XL 60 mg daily. Management per psychiatry      #Sinus tachycardia: Heart rate 120s this AM. Regular rate on exam. Likely due to missing propanolol dose and withdrawal from alcohol. Recommend resuming PTA propanolol. Please Notify medicine if HR persistently >120      #Hyponatremia, resolved: 132, mild. Likely due to dehydration. Normalized on repeat labs. Encourage PO hydration.      #Tranaminitis, resolved:  ALT elevated 60, AST elevated to 53. T bili 1.6. Likely secondary to chronic alcohol use. Denies any abdominal pain. Recommend alcohol cessation. Repeat CMP, with normalization LFTs.     #Euthyroid sick syndrome: TSH mildly elevated at 4.50 with normal T4 1.13. Repeat TSH in 4-6 weeks with PCP .       #Methamphetamine use: Sobriety counseling and management per psychiatry      #Tobacco Dependence (10 year history): Agree with Nicorette gum PRN. Cessation counseling.       Legal Status: voluntary    Safety Assessment:   Checks:  15 min  Precautions: withdrawal precautions  Pt has not required locked seclusion or restraints in the past 24 hours to maintain safety, please refer to RN documentation for further details.  Discussed with patient many issues of addiction,triggers, relapse, and establishing a solid recovery program.  Able to give informed consent:  YES   Discussed Risks/Benefits/Side Effects/Alternatives: YES    After discussion of the indications, risks, benefits, alternatives and consequences of no treatment, the patient elects to complete detox and go back to lodging plus

## 2020-11-05 PROCEDURE — 250N000013 HC RX MED GY IP 250 OP 250 PS 637: Performed by: PSYCHIATRY & NEUROLOGY

## 2020-11-05 PROCEDURE — H2032 ACTIVITY THERAPY, PER 15 MIN: HCPCS

## 2020-11-05 PROCEDURE — 99232 SBSQ HOSP IP/OBS MODERATE 35: CPT | Mod: 95 | Performed by: PSYCHIATRY & NEUROLOGY

## 2020-11-05 PROCEDURE — 128N000004 HC R&B CD ADULT

## 2020-11-05 RX ORDER — NALTREXONE HYDROCHLORIDE 50 MG/1
50 TABLET, FILM COATED ORAL DAILY
Status: DISCONTINUED | OUTPATIENT
Start: 2020-11-05 | End: 2020-11-06 | Stop reason: HOSPADM

## 2020-11-05 RX ADMIN — MULTIPLE VITAMINS W/ MINERALS TAB 1 TABLET: TAB at 08:19

## 2020-11-05 RX ADMIN — PROPRANOLOL HYDROCHLORIDE 60 MG: 60 CAPSULE, EXTENDED RELEASE ORAL at 08:19

## 2020-11-05 RX ADMIN — LAMOTRIGINE 100 MG: 100 TABLET ORAL at 08:19

## 2020-11-05 RX ADMIN — FOLIC ACID 1 MG: 1 TABLET ORAL at 08:19

## 2020-11-05 RX ADMIN — TRAZODONE HYDROCHLORIDE 50 MG: 50 TABLET ORAL at 22:30

## 2020-11-05 RX ADMIN — HYDROXYZINE HYDROCHLORIDE 25 MG: 25 TABLET, FILM COATED ORAL at 16:53

## 2020-11-05 RX ADMIN — NICOTINE POLACRILEX 4 MG: 2 GUM, CHEWING BUCCAL at 08:20

## 2020-11-05 RX ADMIN — GABAPENTIN 300 MG: 300 CAPSULE ORAL at 22:30

## 2020-11-05 RX ADMIN — NICOTINE POLACRILEX 4 MG: 2 GUM, CHEWING BUCCAL at 16:55

## 2020-11-05 RX ADMIN — THIAMINE HCL TAB 100 MG 100 MG: 100 TAB at 08:19

## 2020-11-05 RX ADMIN — NALTREXONE HYDROCHLORIDE 50 MG: 50 TABLET, FILM COATED ORAL at 09:00

## 2020-11-05 RX ADMIN — GABAPENTIN 300 MG: 300 CAPSULE ORAL at 16:06

## 2020-11-05 ASSESSMENT — ACTIVITIES OF DAILY LIVING (ADL)
DRESS: INDEPENDENT
HYGIENE/GROOMING: INDEPENDENT
ORAL_HYGIENE: INDEPENDENT

## 2020-11-05 NOTE — PLAN OF CARE
"S: Pt has been visible in the milieu.  She is eating and drinking normally.  She participated in AA [facility] group. She did some laundry.  She has been pleasant and appropriate in her interactions.  He she states that she has more depression than anxiety, rates it at an \"8\".  At the 16:00 assessment she demonstrated alcohol symptoms of diaphoresis and tachycardia.  She scored an MSSA of 8.  She came to writer and wondered if there was anything for her cravings which she said were really bad tonight.  Writer told her that here are some medications and that she needs to talk to Dr. Moise about it tomorrow.  B:  Pt admitted for alcohol withdrawal and detoxification, h/o Depressive disorder.  A:  Pt in moderate to alcohol withdrawal AEB MSSA scores of 8 & 6.  R  Provided with therapeutic conversation.  Administered diazepam 5 mg once.  She had nicotine gum 2 mg 2  pieces, trazodone 50 mg and gabapentin 300 mg.  Continue to monitor and medicate as ordered and indicated.    "

## 2020-11-05 NOTE — PLAN OF CARE
Pt expresses a lot of anxiety, cravings. We discussed coping skills. Neurontin & vistaril 25 mg po given between 1600 & 1700.

## 2020-11-05 NOTE — PLAN OF CARE
"Pt  continues to be monitored for alcohol withdrawal.   MSSA = 4 and 3 this shift. Anticipate she will complete the detox process at 4 PM today.   She denies SI/SIB/HI.  Her mood is good. She is pleasant and cooperative.   Plan is to discharge to [facility] tomorrow.  /75   Pulse 85   Temp 97.5  F (36.4  C) (Temporal)   Resp 16   Ht 1.651 m (5' 5\")   Wt 74.8 kg (165 lb)   SpO2 100%   BMI 27.46 kg/m      "

## 2020-11-05 NOTE — PROGRESS NOTES
[facility], [place]   Psychiatric Progress Note  TTelemedicine Visit: The patient's condition can be safely assessed and treated via synchronous audio and visual telemedicine encounter.   Start Time: 8.25  Stop Time: 8.33  Reason for Telemedicine Visit: Covid-19   Originating Site (Patient Location): [facility]  Distant Site (Provider Location): Provider Remote Setting   Consent: The patient/guardian has verbally consented to: the potential risks and benefits of telemedicine (video visit) versus in person care; bill my insurance or make self-payment for services provided; and responsibility for payment of non-covered services.   Mode of Communication: Video Conference via polycom  As the provider I attest to compliance with applicable laws and regulations related to telemedicine.       The patient/guardian has been notified of the following:   This telemedicine visit is conducted live between you and your clinician. We have found that certain health care needs can be provided without the need for a physical exam. This service lets us provide the care you need with a telemedicine conversation.        Interim history   This is a 30 year old female with Pt seen in rounds.   The patient's care was discussed with the treatment team during the daily team meeting and/or staff's chart notes were reviewed.  Staff report patient has been visible in the milieu,  no acute eventsovernight.         Patient reports she feels better  She did sleep well her appetite is good her energy and motivation are normal hopeful  Denied suicidal/homicidal ideation/plan intent denied.psychosis  No prior suicde attempts  No access to gun  Pt is in alcohol withdrawal still being monitered every 4 hrs for it,   Pt mssa score are monitered  Tolerating meds and has no side effects.              Medications:     Current Facility-Administered Medications   Medication     acetaminophen (TYLENOL) tablet 650 mg     alum &  "mag hydroxide-simethicone (MAALOX  ES) suspension 30 mL     atenolol (TENORMIN) tablet 50 mg     bisacodyl (DULCOLAX) Suppository 10 mg     diazepam (VALIUM) tablet 5-20 mg     folic acid (FOLVITE) tablet 1 mg     gabapentin (NEURONTIN) capsule 300 mg     hydrOXYzine (ATARAX) tablet 25 mg     lamoTRIgine (LaMICtal) tablet 100 mg     loperamide (IMODIUM) capsule 2 mg     magnesium hydroxide (MILK OF MAGNESIA) suspension 30 mL     multivitamin w/minerals (THERA-VIT-M) tablet 1 tablet     nicotine (NICORETTE) gum 2-4 mg     ondansetron (ZOFRAN-ODT) ODT tab 4 mg     propranolol ER (INDERAL LA) 24 hr capsule 60 mg     thiamine (B-1) tablet 100 mg     traZODone (DESYREL) tablet 50 mg             Allergies:   No Known Allergies         Psychiatric Examination:   Blood pressure 104/71, pulse 97, temperature 97.3  F (36.3  C), temperature source Temporal, resp. rate 16, height 1.651 m (5' 5\"), weight 74.8 kg (165 lb), SpO2 100 %.  Weight is 165 lbs 0 oz  Body mass index is 27.46 kg/m .    Appearance:  awake, alert and adequately groomed  Attitude:  cooperative  Eye Contact:  good  Mood:  Neutral   Affect:  appropriate and in normal range  Speech:  clear, coherent rate /rhythm are good  Psychomotor Behavior:  no evidence of tardive dyskinesia, dystonia, or tics and intact station, gait and muscle tone  Throught Process:  logical  Associations:  no loose associations  Thought Content:  no evidence of suicidal ideation or homicidal ideation, no evidence of psychotic thought, no auditory hallucinations present and no visual hallucinations present  Insight:  good  Judgement:  intact  Oriented to:  time, person, and place  Attention Span and Concentration:  intact  Recent and Remote Memory:  intact  Language fund of knowledge are adequate         Labs:     No results found for: NTBNPI, NTBNP  Lab Results   Component Value Date    WBC 8.0 11/02/2020    HGB 14.8 11/02/2020    HCT 44.6 11/02/2020     (H) 11/02/2020     " 11/02/2020     Lab Results   Component Value Date    TSH 4.50 (H) 11/03/2020         DX   Alcohol use disorder severe  Alcohol withdrawal severe  Passive suicidal ideation  Major depressive disorder recurrent severe without psychosis  Generalized anxiety disorder  History of panic disorder  Rule out bipolar affective disorder  Nicotine use disorders smokes half a pack a day  Methamphetamine use disorder mild      PLAN   Alcohol intoxication/withdrawal, presently is on MSSA protocol with Valium. Continue the same MSSA protocol as ordered. Continue thiamine 100 mg p.o. daily, M.V.I. one p.o. daily and folate 1 mg p.o. Daily  Will continue mssa protocal to detox off alcohol on valium,  Pt is c/o of termor , agitation poor sleep and poor appetite, he has sweats, feels shakey  On mssa client scored scored 5 today and  needed needed 0 mg po as of yet , total dose since admission was 70    MSSA    Eating Disturbances: ate and enjoyed all of it or not applicable  Tremor: 0 - no tremor  Sleep Disturbance: slept through the night or not applicable  Clouding of Sensorium: no evidence  Hallucinations: 0 - none  Quality of Contact: 0 - awareness of examiner and people around him/her  Agitation: 0 - normal activity  Paroxysmal Sweats: 0 - no observed sweating  Temperature: 99.5 or below  Pulse: 3 - 90 to 99  Total MSSA Score: 4     Naltrexone   Nicotine cessation counseling given    For her depression and mood swings she will be continue lamotrigine 100 mg and gabapentin 300 mg 3 times a day as needed for anxiety  Patient was advised to utilize the gabapentin for anxiety she has not been doing this  Laboratory/Imaging: reviewed with patient   Consults: internal medicine consult reviewed  Patient will be treated in therapeutic milieu with appropriate individual and group therapies as described.  PDMP CHECKED     Supportive psychotheraoy provided, karan talked about recovery enviroment, relapse prevention, triggers to use.  Discussed  with patient many issues of addiction,triggers, relapse, and establishing a solid recovery program.  Asked pt to be med complinat   Medical diagnoses to be addressed this admission:    Plan:         Assessment and Recommendations:   30 year old female with a history of Bipolar Disorder, ETOH addiction and tobacco dependence (10 year history) admitted to inpatient psychiatry on 11/2/20 for alcohol detox.      #Alcohol Withdrawal:  Patient has a 13-year history of abuse. No history of DTs, seizures. Reports two years ago that she had liver damage on abdominal US. Would like to detox and try treatment inpatient.    -Agree with [facility] protocol, management per Psych  -Agree with thiamine, folic acid and multivitamins per Psych  -Agree with starting Gabapentin per Psych for related agitation, restlessness     #Bipolar Disorder  #Generalized Anxiety Disorder: Patient currently follows with a PA in [place] for medication management and has been on Lamictal (discontinued Zoloft a couple months ago) and atarax PRN. States she tried a therapy session about a month ago with a behavioral therapist and it wasn't a good fit.   -Management per psychiatry      #Panic Disorder:Continue PTA Propanolol XL 60 mg daily. Management per psychiatry      #Sinus tachycardia: Heart rate 120s this AM. Regular rate on exam. Likely due to missing propanolol dose and withdrawal from alcohol. Recommend resuming PTA propanolol. Please Notify medicine if HR persistently >120      #Hyponatremia, resolved: 132, mild. Likely due to dehydration. Normalized on repeat labs. Encourage PO hydration.      #Tranaminitis, resolved:  ALT elevated 60, AST elevated to 53. T bili 1.6. Likely secondary to chronic alcohol use. Denies any abdominal pain. Recommend alcohol cessation. Repeat CMP, with normalization LFTs.     #Euthyroid sick syndrome: TSH mildly elevated at 4.50 with normal T4 1.13. Repeat TSH in 4-6 weeks with PCP .      #Methamphetamine use:  Sobriety counseling and management per psychiatry      #Tobacco Dependence (10 year history): Agree with Nicorette gum PRN. Cessation counseling.       Legal Status: voluntary    Safety Assessment:   Checks:  15 min  Precautions: withdrawal precautions  Pt has not required locked seclusion or restraints in the past 24 hours to maintain safety, please refer to RN documentation for further details.  Discussed with patient many issues of addiction,triggers, relapse, and establishing a solid recovery program.  Able to give informed consent:  YES   Discussed Risks/Benefits/Side Effects/Alternatives: YES    After discussion of the indications, risks, benefits, alternatives and consequences of no treatment, the patient elects to complete detox and [facility]

## 2020-11-06 ENCOUNTER — HOSPITAL ENCOUNTER (OUTPATIENT)
Dept: BEHAVIORAL HEALTH | Facility: CLINIC | Age: 30
End: 2020-11-06
Attending: FAMILY MEDICINE
Payer: COMMERCIAL

## 2020-11-06 VITALS
TEMPERATURE: 97.8 F | RESPIRATION RATE: 16 BRPM | HEART RATE: 103 BPM | DIASTOLIC BLOOD PRESSURE: 81 MMHG | HEIGHT: 65 IN | BODY MASS INDEX: 27.49 KG/M2 | SYSTOLIC BLOOD PRESSURE: 122 MMHG | OXYGEN SATURATION: 100 % | WEIGHT: 165 LBS

## 2020-11-06 VITALS — TEMPERATURE: 98.9 F | OXYGEN SATURATION: 97 %

## 2020-11-06 PROBLEM — F19.20 CHEMICAL DEPENDENCY (H): Status: ACTIVE | Noted: 2020-11-06

## 2020-11-06 LAB — INTERPRETATION ECG - MUSE: NORMAL

## 2020-11-06 PROCEDURE — 250N000013 HC RX MED GY IP 250 OP 250 PS 637: Performed by: PSYCHIATRY & NEUROLOGY

## 2020-11-06 PROCEDURE — 1002N00001 HC LODGING PLUS FACILITY CHARGE ADULT

## 2020-11-06 PROCEDURE — 99239 HOSP IP/OBS DSCHRG MGMT >30: CPT | Mod: 95 | Performed by: PSYCHIATRY & NEUROLOGY

## 2020-11-06 RX ORDER — IBUPROFEN 200 MG
400 TABLET ORAL EVERY 6 HOURS PRN
COMMUNITY
End: 2020-11-28

## 2020-11-06 RX ORDER — LORATADINE 10 MG/1
10 TABLET ORAL DAILY PRN
COMMUNITY
End: 2020-11-28

## 2020-11-06 RX ORDER — NALTREXONE HYDROCHLORIDE 50 MG/1
50 TABLET, FILM COATED ORAL DAILY
Qty: 90 TABLET | Refills: 0 | Status: SHIPPED | OUTPATIENT
Start: 2020-11-06

## 2020-11-06 RX ORDER — GABAPENTIN 300 MG/1
300 CAPSULE ORAL 3 TIMES DAILY PRN
Qty: 90 CAPSULE | Refills: 0 | Status: SHIPPED | OUTPATIENT
Start: 2020-11-06

## 2020-11-06 RX ORDER — MULTIPLE VITAMINS W/ MINERALS TAB 9MG-400MCG
1 TAB ORAL DAILY
Qty: 90 TABLET | Refills: 0 | Status: SHIPPED | OUTPATIENT
Start: 2020-11-06

## 2020-11-06 RX ORDER — ACETAMINOPHEN 325 MG/1
650 TABLET ORAL EVERY 4 HOURS PRN
COMMUNITY
End: 2020-11-28

## 2020-11-06 RX ORDER — TRAZODONE HYDROCHLORIDE 50 MG/1
50 TABLET, FILM COATED ORAL
Qty: 30 TABLET | Refills: 0 | Status: SHIPPED | OUTPATIENT
Start: 2020-11-06

## 2020-11-06 RX ORDER — MAGNESIUM HYDROXIDE/ALUMINUM HYDROXICE/SIMETHICONE 120; 1200; 1200 MG/30ML; MG/30ML; MG/30ML
30 SUSPENSION ORAL EVERY 6 HOURS PRN
COMMUNITY
End: 2020-11-28

## 2020-11-06 RX ORDER — LANOLIN ALCOHOL/MO/W.PET/CERES
3 CREAM (GRAM) TOPICAL
COMMUNITY
End: 2020-11-28

## 2020-11-06 RX ORDER — AMOXICILLIN 250 MG
2 CAPSULE ORAL DAILY PRN
COMMUNITY
End: 2020-11-28

## 2020-11-06 RX ADMIN — NICOTINE POLACRILEX 4 MG: 2 GUM, CHEWING BUCCAL at 08:49

## 2020-11-06 RX ADMIN — GABAPENTIN 300 MG: 300 CAPSULE ORAL at 08:12

## 2020-11-06 RX ADMIN — FOLIC ACID 1 MG: 1 TABLET ORAL at 08:12

## 2020-11-06 RX ADMIN — THIAMINE HCL TAB 100 MG 100 MG: 100 TAB at 08:12

## 2020-11-06 RX ADMIN — MULTIPLE VITAMINS W/ MINERALS TAB 1 TABLET: TAB at 08:12

## 2020-11-06 RX ADMIN — NICOTINE POLACRILEX 4 MG: 2 GUM, CHEWING BUCCAL at 10:23

## 2020-11-06 RX ADMIN — HYDROXYZINE HYDROCHLORIDE 25 MG: 25 TABLET, FILM COATED ORAL at 09:02

## 2020-11-06 RX ADMIN — PROPRANOLOL HYDROCHLORIDE 60 MG: 60 CAPSULE, EXTENDED RELEASE ORAL at 08:12

## 2020-11-06 RX ADMIN — NALTREXONE HYDROCHLORIDE 50 MG: 50 TABLET, FILM COATED ORAL at 08:12

## 2020-11-06 RX ADMIN — LAMOTRIGINE 100 MG: 100 TABLET ORAL at 08:12

## 2020-11-06 ASSESSMENT — ANXIETY QUESTIONNAIRES
IF YOU CHECKED OFF ANY PROBLEMS ON THIS QUESTIONNAIRE, HOW DIFFICULT HAVE THESE PROBLEMS MADE IT FOR YOU TO DO YOUR WORK, TAKE CARE OF THINGS AT HOME, OR GET ALONG WITH OTHER PEOPLE: EXTREMELY DIFFICULT
GAD7 TOTAL SCORE: 21
5. BEING SO RESTLESS THAT IT IS HARD TO SIT STILL: NEARLY EVERY DAY
4. TROUBLE RELAXING: NEARLY EVERY DAY
2. NOT BEING ABLE TO STOP OR CONTROL WORRYING: NEARLY EVERY DAY
3. WORRYING TOO MUCH ABOUT DIFFERENT THINGS: NEARLY EVERY DAY
1. FEELING NERVOUS, ANXIOUS, OR ON EDGE: NEARLY EVERY DAY
7. FEELING AFRAID AS IF SOMETHING AWFUL MIGHT HAPPEN: NEARLY EVERY DAY
6. BECOMING EASILY ANNOYED OR IRRITABLE: NEARLY EVERY DAY

## 2020-11-06 ASSESSMENT — PATIENT HEALTH QUESTIONNAIRE - PHQ9: SUM OF ALL RESPONSES TO PHQ QUESTIONS 1-9: 21

## 2020-11-06 NOTE — PLAN OF CARE
Problem: General Rehab Plan of Care  Goal: Therapeutic Recreation/Music Therapy Goal (Art Therapy)  Description: The patient and/or their representative will achieve their patient-specific goals related to the plan of care.  The patient-specific goals include: emotional expression, emotional regulation, trauma containment, identifying positive strengths and goals.  Outcome: No Change   Art Therapy directive was to create a drawing of a safe place and to identify five items within safe place that represent each of the five senses. Goals of directive: trauma containment, emotional expression.  Pt was a positive participant, focused on task for the full duration of group. Pt karthikeyan an image of a large tree at night and described how nature and the evening/night can be calming for her. Pt also shared five sensory items from safe place.  Pts mood was calm.

## 2020-11-06 NOTE — PROGRESS NOTES
Patient:  Flower Logan    Date: November 6, 2020    Comprehensive Assessment UPDATE       Comprehensive Summary Update and Review  Counselor met with patient on 11/06/2020 and reviewed the Comprehensive Assessment.    There were no changes/updates identified by patient or in chart entries.

## 2020-11-06 NOTE — PROGRESS NOTES
Name: Flower Logan  Date: 11/6/2020  Medical Record: [id number]    Envelope Number: [id number]  List of Contents (List each item separately in new row):   1 Ibuprofen 200 mg/ 1 Loratidine 10 mg/ 1 Famotidine 20 mg  Admission:  I am responsible for any personal items that are not sent to the safe or pharmacy.  [facility] is not responsible for loss, theft or damage of any property in my possession.    Patient Signature:  ___________________________________________       Date/Time:__________________________    Staff Signature: __________________________________       Date/Time:__________________________    2nd Staff person, if patient is unable/unwilling to sign:      __________________________________________________________       Date/Time: __________________________    Discharge:  [facility] has returned all of my personal belongings:    Patient Signature: ________________________________________     Date/Time: ____________________________________    Staff Signature: ______________________________________     Date/Time:_____________________________________
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
## 2020-11-06 NOTE — DISCHARGE SUMMARY
Telemedicine Visit: The patient's condition can be safely assessed and treated via synchronous audio and visual telemedicine encounter.   Start Time: 8.03Stop Time: 8.07  Reason for Telemedicine Visit: Covid-19   Originating Site (Patient Location): [facility]  Distant Site (Provider Location): Provider Remote Setting   Consent: The patient/guardian has verbally consented to: the potential risks and benefits of telemedicine (video visit) versus in person care; bill my insurance or make self-payment for services provided; and responsibility for payment of non-covered services.   Mode of Communication: Video Conference via polycom  As the provider I attest to compliance with applicable laws and regulations related to telemedicine.       The patient/guardian has been notified of the following:   This telemedicine visit is conducted live between you and your clinician. We have found that certain health care needs can be provided without the need for a physical exam. This service lets us provide the care you need with a telemedicine conversation.
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
## 2020-11-06 NOTE — PROGRESS NOTES
Initial Services Plan        Service Initiation Date: 11/6/2020    Immediate health and/or safety concerns: No    Identify health and safety concern(s) below and include plan to address:    None Identified    Treatment suggestions for client during the time between intake (admit date) and completion of the individual treatment plan:     Look for a sober support network, i.e. 12 step, [facility], [facility], etc  Tour the treatment center or outpatient clinic  Introduce yourself to your treatment group. Spend time getting to know your peers  Review your patient or client handbook  Begin working on your treatment goal list    Completed by: CECIL Higgins  Date completed: 11/6/2020 at 2:23 PM

## 2020-11-06 NOTE — PROGRESS NOTES
"[facility] Assessment      Vital signs:     There were no vitals taken for this visit.      Transfer from     Counselor: Panchito  Drug of Choice: ETOH  Last use: 11/1  Home clinic/MD: MD Tristen Caicedoalthea Zuluaga will need to use a covering provider as this provider is on leave   Psychiatrist/therapist:   Norris Taylor   [facility] [address] [place] [address]   [phone]  TY completed -need fax number  Medical history/current conditions: none     H&P Screen:  H&P within the last 90 days: Yes.  Date: 11/6/20 Location:     Mental Health diagnosis: depression and anxiety   Medication compliant?: yes  Recent sucidal thoughts? Not within the last month, previously had thoughts with plan but no intent. Pt agreed to go to staff immediately if she had any suicidal thoughts or self harm thoughts    When? Over a month ago  Current thought of self-harm? Cut herself  6 months ago   Plan? no    Pain assessment:   Pt. Experiencing pain at this time?  No     Community Medical Screen for COVID19    In the last 2 weeks have you been in an large group gatherings (more than 10 people)? no    Have you been covering your nose and mouth while out in the community? yes    Have you come in contact with anyone in the last month who \"was suspected of\" or \"tested positive\" for COVID-19? no    Have you tested positive for COVID-19 in the past 90 days?no  -If yes pt should not be re-tested until 90 days from day one of symptom onset   UNLESS patient is COVID symptomatic, contact infection prevention for recommendation    \"Do you\" or \"have you\" had....any of the following symptoms in the last 2 weeks? no      Fever or chills     Cough     Shortness of breath or difficulty breathing     New muscle or body aches    New headache     New loss of taste or smell    Sore throat     Congestion or runny nose     New and unexplained Nausea or vomiting     Diarrhea    New and " unexplained fatigue    Does the above COVID screen need to be reviewed by Infection Prevention? no    COVID TEST COMPLETED BY [facility] ? Completed in detox    COVID-19 - Pt informed of the following while at LP:    Wear mask over nose/mouth at all times when out in pt milieu    Staff will take temperature and O2Sats twice daily    Practice good hand washing hygiene and avoid touching face    If pt has any of the symptoms below, notify staff immediately.      Fever     Cough     Shortness of breath or difficulty breathing     Chills     Repeated shaking with chills    Muscle pain     Integrative Therapies: Essential Oils    Patient requesting essential oil inhaler to manage (Mood/Mental Health/Physical/Spiritual symptoms).     Discussed appropriate use of essential oil inhalers and instructed patient not to leave labeled product out on unit.     Patient was screened for kidney disease, asthma/reactive airway disease and rashes and wounds or 1st trimester of pregnancy    List Essential Oils requested by pt lavender and calm,     Patient verbalized and demonstrated understanding of how to use essential oil inhaler correctly and will notify [facility] RN with any concerns or side effects. Patient agrees not to share their essential oil inhaler with other clients.  Continue to support the patient in safely utilizing integrative therapies as able to manage symptoms during treatment.       Patient tobacco use:    Do you use tobacco? Pack a day     Are you interested in quitting? Not right now    NRT (Nicotine Replacement therapy) ordered? Yes gum   Pt is aware of the dangers of tobacco cessation and in contemplation.    Pt given written education.      Patient flu vaccine (screen during flu season / offer vaccination at [facility]) pt already received      Nursing Assessment Summary: as above      On-going nursing intervention required?   No    Acute care visit recommended: concerned about recent unprotected sex will  reach out to PCP for labs

## 2020-11-06 NOTE — PROGRESS NOTES
[facility]  [address] [place] [address] [address] [place] [address]        ADULT CD ASSESSMENT ADDENDUM      Patient Name: Flower Logan  Cell Phone:   Home: [phone] (home)    Mobile:   Telephone Information:   Mobile [phone]       Email:  [email or web address]   Emergency Contact: Kameron Bettencourt   Tel:     The patient reported being:  Single, in a serious relationship    With which race do you identify? White    Initial Screening Questions     1. Are you currently having severe withdrawal symptoms that are putting yourself or others in danger?  No    2. Are you currently having severe medical problems that require immediate attention?  No    3. Are you currently having severe emotional or behavioral problems that are putting yourself or others at risk of harm?  No    4. Do you have sufficient reading skills that will enable you to understand written materials, including the program rules and client rights materials?  Yes     Family History and other additional information     Who raised you? (parents, grandparents, adoptive parents, step-parents, etc.)    Both Parents    Please tell me what it was like growing up in your family. (please include any history of substance abuse, mental health issues, emotional/physical/sexual abuse, forms of discipline, and support)     Raised by: mom and dad  Siblings: 1 sister and patient reports she was the first born.  Family CD History: sister drinks but not problematically  Family MH History: cousin has bipolar w/psychotic features, my great uncles had mental illness, suicide  Abuse: Pt denies a history of abuse while growing up.  Supported?: Pt reports that they felt supported 20% of the time while growing up.  Forms of punishment growing up?: I did whatever I wanted. They would ground me for a day then I would go out and party again.      Do you have any children or Stepchildren? No    Are you being investigated by Child  Protection Services? No    Do you have a child protection worker, probation office or ?  No    How would you describe your current finances?  Doing okay - no income    If you are having problems, (unpaid bills, bankruptcy, IRS problems) please explain:  No    If working or a student are you able to function appropriately in that setting? No, explain: unemployed and not a student     Describe your preferred learning style:  by watching someone else demonstrate    What are your some of your personal strengths?  loyal, empathetic, smart, funny    Do you currently participate in community arminda activities, such as attending Taoist, temple, Mosque or Quaker services?  No    How does your spirituality impact your recovery?  Not at all    Do you currently self-administer your medications?  Yes    Have you ever had to lie to people important to you about how much you kenny?   No   Have you ever felt the need to bet more and more money?   No   Have you ever attempted treatment for a gambling problem?   No   Have you ever touched or fondled someone else inappropriately or forced them to have sex with you against their will?   No   Are you or have you ever been a registered sex offender?   No   Is there any history of sexual abuse in your family? No   Have you ever felt obsessed by your sexual behavior, such as having sex with many partners, masturbating often, using pornography often?   Yes, explain: recently with reddy     Have you ever received therapy or stayed in the hospital for mental health problems?   Yes, explain: outpatient therapy     Have you ever hurt yourself, such as cutting, burning or hitting yourself?   Yes, explain: cut myself, last time was a year ago     Have you ever purged, binged or restricted yourself as a way to control your weight?   Yes, explain: restricted     Are you on a special diet?   No     Do you have any concerns regarding your nutritional status?   No     Have you had any  appetite changes in the last 3 months?   Yes, explain: I've been taking naltrexone in the past few days and I'm not very hungry   Have you had weight loss or weight gain of more than 10 lbs in the last 3 months?   If patient gained or lost more than 10 lbs, then refer to program RN / attending Physician for assessment.   No   Was the patient informed of BMI?    Normal, No Intervention   No   Have you engaged in any risk-taking behavior that would put you at risk for exposure to blood-borne or sexually transmitted diseases?   Yes, explain: unprotected sex   Do you have any dental problems?   Yes, Patient referred to go to their dentist.    Have you ever lived through any trauma or stressful life events?   Yes, explain: death of cousin, I'm close with my cousin's sister and ending a relationship in a bad way   In the past month, have you had any of the following symptoms related to the trauma listed above? (dreams, intense memories, flashbacks, physical reactions, etc.)   No   Have you ever believed people were spying on you, or that someone was plotting against you or trying to hurt you?   No   Have you ever believed someone was reading your mind or could hear your thoughts or that you could actually read someone's mind or hear what another person was thinking?   No   Have you ever believed that someone of some force outside of yourself was putting thoughts into your mind or made you act in a way that was not your usual self?  Have you ever though you were possessed?   No   Have you ever believed you were being sent special messages through the TV, radio or newspaper?   No   Have you ever heard things other people couldn't hear, such as voices or other noises?   No   Have you ever had visions when you were awake?  Or have you ever seen things other people couldn't see?   Yes, explain: after I did meth I had hallucinations   Do you have a valid 's license?    Yes     PHQ-9, DERIAN-7 and Suicide Risk Assessment    PHQ-9 on 11/6/2020 DERIAN-7 on 11/6/2020   The patient's PHQ-9 score was 21 out of 27, indicating severe depression.   The patient's DERIAN-7 score was 21 out of 21, indicating severe anxiety.       Dixie-Suicide Severity Rating Scale   Suicide Ideation   1.) Have you ever wished you were dead or that you could go to sleep and not wake up?     Lifetime:  Yes   Past Month:  No     2.) Have you actually had any thoughts of killing yourself?   Lifetime:  Yes   Past Month:  No     3.) Have you been thinking about how you might do this?     Lifetime:  Yes, Describe: overdosing on pills   Past Month:  No     4.) Have you had these thoughts and had some intention of acting on them?     Lifetime:  Yes, Describe: Intoxicated   Past Month:  No     5.) Have you started to work out the details of how to kill yourself?   Lifetime:  No   Past Month:  No     6.) Do you intend to carry out this plan?      Lifetime:  No   Past Month:  No     Intensity of Ideation   Intensity of ideation (1 being least severe, 5 being most severe):     Lifetime:  2   Past Month:  The patient denied having any suicidal thoughts within the past month.     How often do you have these thoughts?  Less than once a week     When you have the thoughts how long do they last?  The patient denied having any suicidal thoughts within the past month.     Can you stop thinking about killing yourself or wanting to die if you want to?  The patient denied having any suicidal thoughts within the past month.     Are there things - anyone or anything (i.e. family, Anabaptist, pain of death) that stopped you from wanting to die or acting on thoughts of suicide?  Protective factors definitely stopped you from attempting suicide     What sort of reasons did you have for thinking about wanting to die or killing yourself (ie end pain, stop how you were feeling, get attention or reaction, revenge)?  Does not apply     Suicidal Behavior   (Suicide Attempt) - Have you made a  suicide attempt?     Lifetime:  The patient had never made a suicide attempt.   Past Month:  The patient had never made a suicide attempt.     Have you engaged in self-harm (non-suicidal self-injury)?  Yes, Describe: cut myself about a year ago     (Interrupted Attempt) - Has there been a time when you started to do something to end your life but someone or something stopped you before you actually did anything?  No     (Aborted or Self-Interrupted Attempt) - Has there been a time when you started to do something to try to end your life but you stopped yourself before you actually did anything?  No     (Preparatory Acts of Behavior) - Have you taken any steps towards making suicide attempt or preparing to kill yourself (such as collecting pills, getting a gun, giving valuables away or writing a suicide note)?  No     Actual Lethality/Medical Damage:  The patient denied ever making a suicidal attempt.       2008  The [employer] [facility] [employer]  Used with permission by Irene Hinton, PhD.       Guide to C-SSRS Risk Ratings   NO IDEATION:  with no active thoughts IDEATION: with a wish to die. IDEATION: with active thoughts. Risk Ratings   If Yes No No 0 - Very Low Risk   If NA Yes No 1 - Low Risk   If NA Yes Yes 2 - Low/moderate risk   IDEATION: associated thoughts of methods without intent or plan INTENT: Intent to follow through on suicide PLAN: Plan to follow through on suicide Risk Ratings cont...   If Yes No No 3 - Moderate Risk   If Yes Yes No 4 - High Risk   If Yes Yes Yes 5 - High Risk   The patient's ADDITIONAL RISK FACTORS and lack of PROTECTIVE FACTORS may increase their overall suicide risk ratings.     Additional Risk Factors:    Someone close to the patient (family member/friend) completed a suicide     Significant history of having untreated or poorly treated mental health symptoms     Tendency to be socially isolated and/or cut off from the support of others     A recent death of  "someone close to the patient and/or unresolved grief and loss issues   Protective Factors:    Having people in his/her life that would prevent the patient from considering a suicide attempt (i.e. young children, spouse, parents, etc.)     An absence of chronic health problems or stable and well treated chronic health issues     A positive relationship with his/her clinical medical and/or mental health providers     Having easy access to supportive family members     Having a good community support network     Risk Status   Past month: 0. - Very Low Risk:  Evaluation Counselors:  Document in Epic / AtigeoAR to counselor \"Very Low Risk\".      Treatment Counselors:  Reassess upon admission as applicable, assess weekly in progress notes under Dimension 3 and summarize in Discharge / Treatment summary under Dimension 3.    Past 24 hours: 0. - Very Low Risk:  Evaluation Counselors:  Document in Epic / SBAR to counselor \"Very Low Risk\".      Treatment Counselors:  Reassess upon admission as applicable, assess weekly in progress notes under Dimension 3 and summarize in Discharge / Treatment summary under Dimension 3.   Additional information to support suicide risk rating: There was no additional information to provide at this time.     Mental Health Status   Physical Appearance/Attire: Appears younger than stated age   Hygiene: well groomed   Eye Contact: at examiner   Speech Rate:  regular   Speech Volume: regular   Speech Quality: fluid   Cognitive/Perceptual:  reality based   Cognition: memory intact    Judgment: intact and able to concentrate   Insight: intact and able to concentrate   Orientation:  time, place, person and situation   Thought: logical    Hallucinations:  none   General Behavioral Tone: cooperative   Psychomotor Activity: no problem noted   Gait:  no problem   Mood: normal and anxious   Affect: blunted/restricted   Counselor Notes: NA     Criteria for Diagnosis: DSM-5 Criteria for Substance Use Disorders  "     Alcohol Use Disorder Severe - 303.90 (F10.20)  Tobacco Use Disorder Severe - 305.10 (F17.200)    Level of Care   I.) Intoxication and Withdrawal: 0   II.) Biomedical:  0   III.) Emotional and Behavioral:  2   IV.) Readiness to Change:  1   V.) Relapse Potential: 4   VI.) Recovery Environmental: 3     Initial Problem List     The patient lacks relapse prevention skills  The patient has poor coping skills  The patient has poor refusal skills   The patient lacks a sober peer support network  The patient has dual issues of MI and CD  The patient lacks the ability to effectively manage his/her mental health issues    Patient/Client is willing to follow treatment recommendations.  Yes    Counselor: CECIL Higgins

## 2020-11-06 NOTE — PLAN OF CARE
Problem: Alcohol Withdrawal  Goal: Alcohol Withdrawal Symptom Control  Outcome: Improving     The patient slept soundly throughout the night with no issues.

## 2020-11-06 NOTE — DISCHARGE SUMMARY
"Admit Date:     11/02/2020   Discharge Date:           More than 35 minutes spent on discharge summary, doing the discharge instructions, discharge medications,  discharge mental status examination.      DISCHARGE DIAGNOSES:   Axis I:   1.  Alcohol use disorder, severe.   2.  Alcohol withdrawal, completed.   3.  Major depressive disorder, recurrent, severe without psychosis.   4.  Generalized anxiety disorder, rule out bipolar affective disorder.   5.  Nicotine use disorder.      Please see detailed admission note by Dr. Moise on 11/03/2020.      HOSPITAL COURSE:  During the hospitalization, the patient was detoxed off alcohol using MSS protocol.  She was continued on lamotrigine and gabapentin added 300 mg 3 times a day as needed for her anxiety.  She was seen by Ping Sena on 11/03/2020 for Internal Medicine consult.   \"       Assessment and Recommendations:   30 year old female with a history of Bipolar Disorder, ETOH addiction and tobacco dependence (10 year history) admitted to inpatient psychiatry on 11/2/20 for alcohol detox.      #Alcohol Withdrawal:  Patient has a 13-year history of abuse. No history of DTs, seizures. Reports two years ago that she had liver damage on abdominal US. Would like to detox and try treatment inpatient.    -Agree with MSSA protocol, management per Psych  -Agree with thiamine, folic acid and multivitamins per Psych  -Agree with starting Gabapentin per Psych for related agitation, restlessness     #Bipolar Disorder  #Generalized Anxiety Disorder: Patient currently follows with a PA in [place] for medication management and has been on Lamictal (discontinued Zoloft a couple months ago) and atarax PRN. States she tried a therapy session about a month ago with a behavioral therapist and it wasn't a good fit.   -Management per psychiatry      #Panic Disorder:Continue PTA Propanolol XL 60 mg daily. Management per psychiatry      #Sinus tachycardia: Heart rate 120s this AM. " Regular rate on exam. Likely due to missing propanolol dose and withdrawal from alcohol. Recommend resuming PTA propanolol. Please Notify medicine if HR persistently >120      #Hyponatremia, resolved: 132, mild. Likely due to dehydration. Normalized on repeat labs. Encourage PO hydration.      #Tranaminitis, resolved:  ALT elevated 60, AST elevated to 53. T bili 1.6. Likely secondary to chronic alcohol use. Denies any abdominal pain. Recommend alcohol cessation. Repeat CMP, with normalization LFTs.     #Euthyroid sick syndrome: TSH mildly elevated at 4.50 with normal T4 1.13. Repeat TSH in 4-6 weeks with PCP .      #Methamphetamine use: Sobriety counseling and management per psychiatry      #Tobacco Dependence (10 year history): Agree with Nicorette gum PRN. Cessation counseling.      Medicine will sign off. No further recommendations at this time. Please page the on-call [facility] for any intercurrent medical issues which arise.      Ping Sena PA-C  Hospitalist Service  [phone]           During the hospitalization, the patient's energy, motivation, sleep and interest improved.  She does not have any suicidal or homicidal ideation, plan or intent.  She is [facility].  She is going [facility] to [facility].  She was also started on naltrexone for craving.      DISCHARGE MENTAL STATUS EXAMINATION:  The patient is alert, oriented x 3.  Recent and remote memory, language, fund of knowledge adequate.  No loose associations.  The patient does not have any active suicidal or homicidal ideation, plan or intent.  The patient is stable to be discharged.         Patient had lab as below    Recent Results (from the past 240 hour(s))   Alcohol breath test POCT    Collection Time: 11/02/20 11:09 AM   Result Value Ref Range    Alcohol Breath Test 0.000 0.00 - 0.01   Drug abuse screen 6 urine (chem dep)    Collection Time: 11/02/20 11:46 AM   Result Value Ref Range    Amphetamine Qual Urine  Positive (A) NEG^Negative    Barbiturates Qual Urine Negative NEG^Negative    Benzodiazepine Qual Urine Negative NEG^Negative    Cannabinoids Qual Urine Negative NEG^Negative    Cocaine Qual Urine Negative NEG^Negative    Ethanol Qual Urine Negative NEG^Negative    Opiates Qualitative Urine Negative NEG^Negative   HCG qualitative urine (UPT)    Collection Time: 11/02/20 11:46 AM   Result Value Ref Range    HCG Qual Urine Negative NEG^Negative   CBC with platelets differential    Collection Time: 11/02/20 12:34 PM   Result Value Ref Range    WBC 8.0 4.0 - 11.0 10e9/L    RBC Count 4.21 3.8 - 5.2 10e12/L    Hemoglobin 14.8 11.7 - 15.7 g/dL    Hematocrit 44.6 35.0 - 47.0 %     (H) 78 - 100 fl    MCH 35.2 (H) 26.5 - 33.0 pg    MCHC 33.2 31.5 - 36.5 g/dL    RDW 12.4 10.0 - 15.0 %    Platelet Count 348 150 - 450 10e9/L    Diff Method Automated Method     % Neutrophils 74.5 %    % Lymphocytes 14.3 %    % Monocytes 9.0 %    % Eosinophils 0.8 %    % Basophils 1.1 %    % Immature Granulocytes 0.3 %    Nucleated RBCs 0 0 /100    Absolute Neutrophil 6.0 1.6 - 8.3 10e9/L    Absolute Lymphocytes 1.1 0.8 - 5.3 10e9/L    Absolute Monocytes 0.7 0.0 - 1.3 10e9/L    Absolute Eosinophils 0.1 0.0 - 0.7 10e9/L    Absolute Basophils 0.1 0.0 - 0.2 10e9/L    Abs Immature Granulocytes 0.0 0 - 0.4 10e9/L    Absolute Nucleated RBC 0.0    Comprehensive metabolic panel    Collection Time: 11/02/20 12:34 PM   Result Value Ref Range    Sodium 132 (L) 133 - 144 mmol/L    Potassium 3.7 3.4 - 5.3 mmol/L    Chloride 99 94 - 109 mmol/L    Carbon Dioxide 23 20 - 32 mmol/L    Anion Gap 10 3 - 14 mmol/L    Glucose 78 70 - 99 mg/dL    Urea Nitrogen 11 7 - 30 mg/dL    Creatinine 0.89 0.52 - 1.04 mg/dL    GFR Estimate 88 >60 mL/min/[1.73_m2]    GFR Estimate If Black >90 >60 mL/min/[1.73_m2]    Calcium 9.6 8.5 - 10.1 mg/dL    Bilirubin Total 1.6 (H) 0.2 - 1.3 mg/dL    Albumin 4.1 3.4 - 5.0 g/dL    Protein Total 8.9 (H) 6.8 - 8.8 g/dL    Alkaline  Phosphatase 94 40 - 150 U/L    ALT 60 (H) 0 - 50 U/L    AST 53 (H) 0 - 45 U/L   Asymptomatic COVID-19 Virus (Coronavirus) by PCR    Collection Time: 11/02/20 12:34 PM    Specimen: Nasopharyngeal   Result Value Ref Range    COVID-19 Virus PCR to [facility] - Source Nasopharyngeal     COVID-19 Virus PCR to [facility] - Result       Test received-See reflex to IDDL test SARS CoV2 (COVID-19) Virus RT-PCR   SARS-CoV-2 COVID-19 Virus (Coronavirus) RT-PCR Nasopharyngeal    Collection Time: 11/02/20 12:34 PM    Specimen: Nasopharyngeal   Result Value Ref Range    SARS-CoV-2 Virus Specimen Source Nasopharyngeal     SARS-CoV-2 PCR Result NEGATIVE     SARS-CoV-2 PCR Comment       Testing was performed using the [employer]plex[employer] COVID-19 Direct Assay on the [employer] MDX   instrument. Additional information about this Emergency Use Authorization (EUA) assay can   be found via the Lab Guide.     GGT    Collection Time: 11/03/20  7:03 AM   Result Value Ref Range     (H) 0 - 40 U/L   Vitamin B12    Collection Time: 11/03/20  7:03 AM   Result Value Ref Range    Vitamin B12 308 193 - 986 pg/mL   Lipid panel    Collection Time: 11/03/20  7:03 AM   Result Value Ref Range    Cholesterol 223 (H) <200 mg/dL    Triglycerides 174 (H) <150 mg/dL    HDL Cholesterol 59 >49 mg/dL    LDL Cholesterol Calculated 129 (H) <100 mg/dL    Non HDL Cholesterol 164 (H) <130 mg/dL   TSH with free T4 reflex and/or T3 as indicated    Collection Time: 11/03/20  7:03 AM   Result Value Ref Range    TSH 4.50 (H) 0.40 - 4.00 mU/L   T4 free    Collection Time: 11/03/20  7:03 AM   Result Value Ref Range    T4 Free 1.13 0.76 - 1.46 ng/dL   Folate    Collection Time: 11/03/20  7:03 AM   Result Value Ref Range    Folate 17.9 >5.4 ng/mL   Comprehensive metabolic panel    Collection Time: 11/03/20  7:03 AM   Result Value Ref Range    Sodium 138 133 - 144 mmol/L    Potassium 4.0 3.4 - 5.3 mmol/L    Chloride 107 94 - 109 mmol/L    Carbon Dioxide 22 20 - 32 mmol/L     Anion Gap 9 3 - 14 mmol/L    Glucose 91 70 - 99 mg/dL    Urea Nitrogen 12 7 - 30 mg/dL    Creatinine 0.96 0.52 - 1.04 mg/dL    GFR Estimate 80 >60 mL/min/[1.73_m2]    GFR Estimate If Black >90 >60 mL/min/[1.73_m2]    Calcium 9.3 8.5 - 10.1 mg/dL    Bilirubin Total 0.9 0.2 - 1.3 mg/dL    Albumin 3.4 3.4 - 5.0 g/dL    Protein Total 7.3 6.8 - 8.8 g/dL    Alkaline Phosphatase 72 40 - 150 U/L    ALT 46 0 - 50 U/L    AST 44 0 - 45 U/L   EKG 12-lead, complete    Collection Time: 11/04/20  9:16 AM   Result Value Ref Range    Interpretation ECG Click View Image link to view waveform and result                Labs:   No results found for this or any previous visit (from the past 48 hour(s)).  Because this patient meets criteria for an Alcohol Use Disorder, I performed the following brief intervention on the date of this note:              1) Expressed concern that the patient is drinking at unhealthy levels known to increase their risk of alcohol related problems              2) Gave feedback linking alcohol use and health, including personalized feedback explaining how alcohol use can interact with their medical and/or psychiatric problems, and with prescribed medications.              3) Advised patient to abstain.      DISCHARGE MENTAL STATUS EXAMINATION:  The patient is alert, oriented x3.  Good fund of knowledge.  Good use of language.  Recent and remote memory, language, fund of knowledge are all adequate.  Euthymic mood congruent affect  Speech normal rate/rhythm linear tp no loose asso,The patient does not have any active suicidal or homicidal ideation.  Does not have any auditory or visual hallucination.  Fair insight/judgment At this time, the patient was stable to be discharged.        Pt was not determined to not be a danger to himself or others. At the current time of discharge, the patient does not meet criteria for involuntary hospitalization. On the day of discharge, the patient reports that they do not have  suicidal or homicidal ideation and would never hurt themselves or others. Steps taken to minimize risk include: assessing patient s behavior and thought process daily during hospital stay, discharging patient with adequate plan for follow up for mental and physical health and discussing safety plan of returning to the hospital should the patient ever have thoughts of harming themselves or others. Therefore, based on all available evidence including the factors cited above, the patient does not appear to be at imminent risk for self-harm, and is appropriate for outpatient level of care.     Educated about side effects/risk vs benefits /alternative including non treatment.Pt consented to be on medication.     .Total time spent on discharge summary more than 35 min  More than  20 min  planning, coordination of care, medication reconciliation and performance of physical exam on day of discharge.Care was coordinated with unit RN and unit therapist    .   Flower Logan   Home Medication Instructions HUMBERTO:[id number]    Printed on:11/08/20 0926   Medication Information                      gabapentin (NEURONTIN) 300 MG capsule  Take 1 capsule (300 mg) by mouth 3 times daily as needed (anxiety)             hydrOXYzine (ATARAX) 25 MG tablet  Take 1 tablet (25 mg) by mouth 3 times daily as needed for anxiety             lamoTRIgine (LAMICTAL) 100 MG tablet  Take 100 mg by mouth daily             multivitamin w/minerals (THERA-VIT-M) tablet  Take 1 tablet by mouth daily             naltrexone (DEPADE/REVIA) 50 MG tablet  Take 1 tablet (50 mg) by mouth daily             nicotine (NICORETTE) 2 MG gum  Place 1-2 each (2-4 mg) inside cheek every hour as needed for other (nicotine withdrawal symptoms)             propranolol ER (INDERAL LA) 60 MG 24 hr capsule  Take 60 mg by mouth daily             traZODone (DESYREL) 50 MG tablet  Take 1 tablet (50 mg) by mouth nightly as needed for sleep                 Disposition: Lodging  "[facility]  Phone: [phone]  About: \"[facility] patients receive quality care for alcohol and/or drug use in a living environment that is conducive to their recovery. Our residents receive 30 hours of service per week, including group therapy, individual counseling, spiritual care and education sessions. Each patient is assigned to a primary counselor and therapy group for the duration of their stay. Shortly after admission to [facility], the patient will work with a counselor to develop a personalized treatment plan. This plan outlines treatment goals that, when reached, will determine the next steps in the recovery process.\"     Facts about COVID19 at www.cdc.gov/COVID19 and www.[place].gov/covid19     Keeping hands clean is one of the most important steps we can take to avoid getting sick and spreading germs to others.  Please wash your hands frequently and lather with soap for at least 20 seconds!     Follow-up Appointment:   [facility] [address] [place] [address] [phone]   Pam Allison MD     Please follow up with primary MD  within 30 days for post hospitalization checkup.       ROSA M VILLAGRAN MD             D: 2020   T: 2020   MT: DEMETRIS      Name:     SANDRITA SEXTON   MRN:      [id number]        Account:        [id number]   :      1990           Admit Date:     2020                                  Discharge Date:       Document: [id number]       cc: Ghazal Godinez MD     "

## 2020-11-06 NOTE — DISCHARGE INSTRUCTIONS
"Behavioral Discharge Planning and Instructions  THANK YOU FOR CHOOSING [facility]  [facility]  [phone]    Summary: You were admitted to [facility] on 11/2/2020 for detoxification from alcohol.  A medical exam was performed that included lab work. You have met with a  and opted to attend [facility].  Please take care and make your recovery a daily priority, Flower! It was a pleasure working with you and the entire treatment team here wishes you the very best in your recovery!     Recommendation:  Enter and complete a residential treatment program such as [facility] and follow all continuing care recommendations.      Main Diagnoses:  Per Dr. Jose Maria MD:   Alcohol use disorder severe  Alcohol withdrawal severe  Generalized anxiety disorder      Major Treatments, Procedures and Findings:  You were treated for alcohol detoxification using valium administered based on the [facility] protocol. You did not complete a chemical dependency assessment. You had labs drawn and those results were reviewed with you. Please take a copy of your lab work with you to your next primary care physician appointment.    Symptoms to Report:  If you experience more anxiety, confusion, sleeplessness, deep sadness or thoughts of suicide, notify your treatment team or notify your primary care physician. IF ANY OF THE SYMPTOMS YOU ARE EXPERIENCING ARE A MEDICAL EMERGENCY CALL 911 IMMEDIATELY.     Lifestyle Adjustment: Adjust your lifestyle to get enough sleep, relaxation, exercise and good nutrition. Continue to develop healthy coping skills to decrease stress and promote a sober living environment. Do not use mood altering substances including alcohol, illegal drugs or addictive medications other than what is currently prescribed.     Disposition: [facility]  Phone: [phone]  About: \"[facility] patients receive quality care for alcohol and/or drug use in a living environment that is conducive to their " "recovery. Our residents receive 30 hours of service per week, including group therapy, individual counseling, spiritual care and education sessions. Each patient is assigned to a primary counselor and therapy group for the duration of their stay. Shortly after admission to [facility], the patient will work with a counselor to develop a personalized treatment plan. This plan outlines treatment goals that, when reached, will determine the next steps in the recovery process.\"    Facts about COVID19 at www.cdc.gov/COVID19 and www.[place].gov/covid19    Keeping hands clean is one of the most important steps we can take to avoid getting sick and spreading germs to others.  Please wash your hands frequently and lather with soap for at least 20 seconds!    Follow-up Appointment:   [facility]    [address] [place] [place] [address] [phone]   Pam Allison MD     Please follow up with primary MD  within 30 days for post hospitalization checkup.    Recovery apps for your phone to locate current in person and zoom recovery meetings  [place] - meeting abdirizak  AA  - meeting abdirizak  Meeting guide - meeting abdirizak  Quick NA meeting - meeting abdirizak  Joe- has various apps    Resources:  *due to covid-19 most AA/NA meetings are being held online*  AA meetings can be found online; search for them at: http://aa-intergroup.org/directory.php  AA meetings via [place] for [place] area can be found online at: https://a[place].org/find-a-meeting/holiday-closings/  NA meetings via [place] for [place] area can be found online at: https://sites.google.com/view/[place]regionofnarcoticsanonymous/home?authuser=2  AA/NA and Sponsors are excellent resources for support and you can find one at any support group meeting.   Alcoholics Anonymous (www.alcoholics-anonymous.org): for local information 24 hours/day  AA Intergroup service office in [place] (http://www.[place].org/) 804.605.7313  AA Intergroup service " office in [facility]: [phone]. (http://www.C[place].org/)  Narcotics Anonymous (www.nam[place].org) (167) 683-6818  https://aafairviewriverside.org/meetings  SMART Recovery - self management for addiction recovery:  www.smartrecovery.org  Pathways ~ A Health Crisis Resource & Support Center:  786.587.1583.  https://prescribetoprevent.org/patient-education/videos/  http://www.harmreduction.org  [facility] 699-302-1052  Support Group:  AA/NA and Sponsor/support.  National [place] on Mental Illness (www.[place].diya.org): 814.986.3025 or 230-471-2018.  Alcoholics Anonymous (www.alcoholics-anonymous.org): Check your phone book for your local chapter.  Suicide Awareness Voices of Education (SAVE) (www.save.org): 889-801-FBYO (6537)  National Suicide Prevention Line (www.mentalhealthmn.org): 140-255-AZFA (7127)  Mental Health Consumer/Survivor Network of [place] (www.mhcsn.net): 628.462.8608 or 154-452-3550  Mental Health Association of [place] (www.mentalhealth.org): 938.929.1252 or 445-263-5890   Substance Abuse and Mental Health Services (www.samhsa.gov)  [place] Opioid Prevention Coalition: www.opioidcoalition.org    [place] Recovery Connection ([facility])  [facility] connects people seeking recovery to resources that help foster and sustain long-term recovery.  Whether you are seeking resources for treatment, transportation, housing, job training, education, health care or other pathways to recovery, [facility] is a great place to start.  894.982.7975.  www.[place].org    Great Pod casts for nutrition and wellness  Listen on Apple Podcasts  Dishing [employer] Nutrition   [employer]   Nutrition       Understand the connection between what you eat and how you feel. Hosted by licensed nutritionists and dietitians from [employer] we share practical, real-life solutions for healthier living through nutrition.     General Medication Instructions:   See your medication  sheet(s) for instructions.   Take all medications as prescribed.  Make no changes unless your doctor suggests them.     Please Note:  If you have any questions at anytime after you are discharged please call [facility] detox unit [facility] at [phone].  [facility], Behavioral Intake [phone]  Medical Records call [phone]  Outpatient Behavioral Intake call [phone]  LP+ Wait List/Bed Availability call [phone]    Please remember to take all of your behavioral discharge planning and lab paperwork to any follow up appointments, it contains your lab results, diagnosis, medication list and discharge recommendations.      THANK YOU FOR CHOOSING [facility]

## 2020-11-06 NOTE — PLAN OF CARE
Pt verbalized complete understanding of all discharge instructions. Pt discharged to [facility] escorted by staff.

## 2020-11-07 ENCOUNTER — HOSPITAL ENCOUNTER (OUTPATIENT)
Dept: BEHAVIORAL HEALTH | Facility: CLINIC | Age: 30
End: 2020-11-07
Attending: FAMILY MEDICINE
Payer: COMMERCIAL

## 2020-11-07 VITALS — OXYGEN SATURATION: 98 % | TEMPERATURE: 98.5 F

## 2020-11-07 PROCEDURE — H2035 A/D TX PROGRAM, PER HOUR: HCPCS | Mod: HQ

## 2020-11-07 PROCEDURE — 1002N00001 HC LODGING PLUS FACILITY CHARGE ADULT

## 2020-11-07 ASSESSMENT — ANXIETY QUESTIONNAIRES: GAD7 TOTAL SCORE: 21

## 2020-11-07 NOTE — GROUP NOTE
Group Therapy Documentation    PATIENT'S NAME: Flower Logan  MRN:   [id number]  :   1990  ACCT. NUMBER: [id number]  DATE OF SERVICE: 20  START TIME:  9:00 AM  END TIME: 11:00 AM  FACILITATOR(S): Dimple Rivas [facility]; Kyrie De Los Santos [facility]  TOPIC: BEH Group Therapy  Number of patients attending the group:  26  Group Length: 2    Group Therapy Type: Daily living/independence skills    Summary of Group / Topics Discussed:    Relationship/socialization, Balanced lifestyle, and Emotions/expression      Group Attendance:  Attended group session    Patient's response to the group topic/interactions:  cooperative with task    Patient appeared to be Actively participating and Engaged.        Client specific details: Flower  participated in group activities during relationship workshop and gave appropriate answers.

## 2020-11-07 NOTE — GROUP NOTE
"Group Therapy Documentation    PATIENT'S NAME: Flower Logan  MRN:   [id number]  :   1990  ACCT. NUMBER: [id number]  DATE OF SERVICE: 20  START TIME:  1:00 PM  END TIME:  3:00 PM  FACILITATOR(S): Kyrie De Los Santos LAD; Dimple Rivas [facility]; [facility]  TOPIC: BEH Group Therapy  Number of patients attending the group: 26  Group Length:  2 Hours    Group Therapy Type: Recovery strategies    Summary of Group / Topics Discussed:    Recovery Principles      Group Attendance:  Attended group session    Patient's response to the group topic/interactions:  cooperative with task    Patient appeared to be Attentive.        Client specific details:  Flower took part in afternoon group. They were attentive and engaged. The topics included:  \"How to make friends as an adult?\", and \"What is unrequited love?\".  "

## 2020-11-07 NOTE — PROGRESS NOTES
Name: Flower Logan  Date: 11/6/2020  Medical Record: [id number]    Envelope Number: [id number]    List of Contents (List each item separately in new row):   1 black cellphone with cracked screen; 1 black nelida    Admission:  I am responsible for any personal items that are not sent to the safe or pharmacy.  [facility] is not responsible for loss, theft or damage of any property in my possession.      Patient Signature:  ___________________________________________       Date/Time:__________________________    Staff Signature: __________________________________       Date/Time:__________________________    2nd Staff person, if patient is unable/unwilling to sign:      __________________________________________________________       Date/Time: __________________________      Discharge:  [facility] has returned all of my personal belongings:    Patient Signature: ________________________________________     Date/Time: ____________________________________    Staff Signature: ______________________________________     Date/Time:_____________________________________

## 2020-11-08 ENCOUNTER — HOSPITAL ENCOUNTER (OUTPATIENT)
Dept: BEHAVIORAL HEALTH | Facility: CLINIC | Age: 30
End: 2020-11-08
Attending: FAMILY MEDICINE
Payer: COMMERCIAL

## 2020-11-08 VITALS — TEMPERATURE: 96.7 F | OXYGEN SATURATION: 97 %

## 2020-11-08 PROCEDURE — 1002N00001 HC LODGING PLUS FACILITY CHARGE ADULT

## 2020-11-08 PROCEDURE — H2035 A/D TX PROGRAM, PER HOUR: HCPCS | Mod: HQ

## 2020-11-08 NOTE — GROUP NOTE
Group Therapy Documentation    PATIENT'S NAME: Flower Logan  MRN:   [id number]  :   1990  ACCT. NUMBER: [id number]  DATE OF SERVICE: 20  START TIME:  1:00 PM  END TIME:  2:00 PM  FACILITATOR(S): Kyrie De Los Santos [facility]; Dimple Rivas LADC  TOPIC: BEH Group Therapy  Number of patients attending the group:  26  Group Length:  1 Hours    Group Therapy Type: Recovery strategies    Summary of Group / Topics Discussed:    Recovery Principles      Group Attendance:  Attended group session    Patient's response to the group topic/interactions:  Attentive    Patient appeared to be: Engaged.        Client specific details:  Flower was attentive and engaged in afternoon Skills group. The topic was strengths and patient gave examples of their own and shared how these can benefit them in their recovery.

## 2020-11-08 NOTE — GROUP NOTE
Psychoeducation Group Documentation    PATIENT'S NAME: Flower Logan  MRN:   [id number]  :   1990  ACCT. NUMBER: [id number]  DATE OF SERVICE: 20  START TIME:  9:00 AM  END TIME: 11:00 AM  FACILITATOR(S): Renata Rice, CHARMAINE; Dimple Rivas LADC  TOPIC: BEH Pyschoeducation  Number of patients attending the group:  25  Group Length:  2 Hours    Skills Group Therapy Type: Healthy behaviors development    Summary of Group / Topics Discussed:    Balanced lifestyle skills,           Group Attendance:  Attended group session    Patient's response to the group topic/interactions:  cooperative with task    Patient appeared to be Engaged.         Client specific details: Flower was attentive during RN lecture on TB, Hep ABC. He was appropriate in group.

## 2020-11-09 ENCOUNTER — HOSPITAL ENCOUNTER (OUTPATIENT)
Dept: BEHAVIORAL HEALTH | Facility: CLINIC | Age: 30
End: 2020-11-09
Attending: FAMILY MEDICINE
Payer: COMMERCIAL

## 2020-11-09 ENCOUNTER — TRANSFERRED RECORDS (OUTPATIENT)
Dept: HEALTH INFORMATION MANAGEMENT | Facility: CLINIC | Age: 30
End: 2020-11-09

## 2020-11-09 PROCEDURE — H2035 A/D TX PROGRAM, PER HOUR: HCPCS

## 2020-11-09 PROCEDURE — 1002N00001 HC LODGING PLUS FACILITY CHARGE ADULT

## 2020-11-09 PROCEDURE — H2035 A/D TX PROGRAM, PER HOUR: HCPCS | Mod: HQ

## 2020-11-09 NOTE — GROUP NOTE
Group Therapy Documentation    PATIENT'S NAME: Flower Logan  MRN:   [id number]  :   1990  ACCT. NUMBER: [id number]  DATE OF SERVICE: 20  START TIME:  9:00 AM  END TIME: 11:00 AM  FACILITATOR(S): Ebony Sarmiento LADC  TOPIC: BEH Group Therapy  Number of patients attending the group:  6  Group Length:  2 Hours    Group Therapy Type: Recovery strategies and Emotion processing    Summary of Group / Topics Discussed:    Recovery Principles, Mindfulness/Relaxation, and Emotions/expression      Group Attendance:  Attended group session    Patient's response to the group topic/interactions:  cooperative with task and listened actively    Patient appeared to be Attentive, actively participated and Engaged.      Client specific details:  Pt attended her first small group this am. Pt checked in with feelings of calm. She became engaged in group process and gave supportive feedback to peers. Pt shared she would like to be a better galvez.

## 2020-11-09 NOTE — PROGRESS NOTES
Met 1:1 with pt to develop treatment plan. Pt shared she is working towards her LPCC and has interest int getting her LADC. Pt shared issues with guilt,shame, codependent behaviors, and control issues

## 2020-11-09 NOTE — IP AVS SNAPSHOT
Medication List       Patient: SANDRITA SEXTON   : 1990   Physician: Ghazal Godinez Mai, MD           This is your record.  Keep this with you and show to your community pharmacist(s) and physician(s) at each visit.     Allergies:  SHELLFISH-DERIVED PRODUCTS - (reactions not documented)               Medications  Valid as of: 2020 - 12:33 PM    Generic Name Brand Name Tablet Size Instructions for use    Acetaminophen TYLENOL 325 MG Take 650 mg by mouth every 4 hours as needed for mild pain        Alum & Mag Hydroxide-Simeth MAALOX 200-200-20 MG/5ML Take 30 mLs by mouth every 6 hours as needed for indigestion        Gabapentin NEURONTIN 300 MG Take 1 capsule (300 mg) by mouth 3 times daily as needed (anxiety)        guaiFENesin ROBITUSSIN 20 mg/mL Take 10 mLs by mouth every 4 hours as needed for cough        hydrOXYzine HCl ATARAX 25 MG Take 1 tablet (25 mg) by mouth 3 times daily as needed for anxiety        Ibuprofen ADVIL/MOTRIN 200 MG Take 400 mg by mouth every 6 hours as needed for mild pain        lamoTRIgine LaMICtal 100 MG Take 100 mg by mouth daily        Loratadine CLARITIN 10 MG Take 10 mg by mouth daily as needed for allergies        Melatonin melatonin 3 MG Take 3 mg by mouth nightly as needed for sleep        Multiple Vitamins-Minerals THERA-VIT-M  Take 1 tablet by mouth daily        Naltrexone HCl DEPADE/REVIA 50 MG Take 1 tablet (50 mg) by mouth daily        Nicotine Polacrilex NICORETTE 2 MG Place 1-2 each (2-4 mg) inside cheek every hour as needed for other (nicotine withdrawal symptoms)        Propranolol HCl INDERAL LA 60 MG Take 60 mg by mouth daily        Sennosides-Docusate Sodium SENOKOT-S/PERICOLACE 8.6-50 MG Take 2 tablets by mouth daily as needed for constipation        Throat Lozenges CEPASTAT 14.5 MG Place 1 lozenge inside cheek every 2 hours as needed for moderate pain        traZODone HCl DESYREL 50 MG Take 1 tablet (50 mg) by mouth nightly as needed for  sleep        .           .           .           .

## 2020-11-10 ENCOUNTER — HOSPITAL ENCOUNTER (OUTPATIENT)
Dept: BEHAVIORAL HEALTH | Facility: CLINIC | Age: 30
End: 2020-11-10
Attending: FAMILY MEDICINE
Payer: COMMERCIAL

## 2020-11-10 VITALS — TEMPERATURE: 98.2 F | OXYGEN SATURATION: 98 %

## 2020-11-10 VITALS — OXYGEN SATURATION: 100 % | TEMPERATURE: 98.4 F

## 2020-11-10 PROCEDURE — H2035 A/D TX PROGRAM, PER HOUR: HCPCS | Mod: HQ

## 2020-11-10 PROCEDURE — 1002N00001 HC LODGING PLUS FACILITY CHARGE ADULT

## 2020-11-10 NOTE — PROGRESS NOTES
"Patient:  Flower Logan            Adult CD Progress Note and Treatment Plan Review     Attendance  Please refer to OP BEH CD Adult Attendance Record Documentation Flowsheet    Support group attended this week: yes    Reporting sobriety:  Yes    Treatment Plan     Treatment Plan Review competed on: 11/10/2020  This review covers 11/06-11/10/2020      Client preferred learning style: Hands on  Verbal    Staff Members contributing CECIL Coyne and  CECIL Aviles, CECIL Wade.      Received Supervision: Yes    Client: contributed to goals and plan.    Client received copy of plan/revised plan: Yes    Client agrees with plan/revised plan: Yes    Changes to Treatment Plan: No    New Goals added since last review No    Goals worked on since last review: This is pt's first treatment. Pt is working on her \"Book of Me\" assignment. She is also working on developing strategies to maintain long term sobriety.    Strategies effective: yes    Strategies need these changes: Continue to address goals.    1) Care Coordination Activities: None  2) Medical, Mental Health and other appointments the client attended: None  3) Medication issues: None reported  4) Physical and mental health problems: See dimension 2/3  5) Any changes in Vulnerable Adult Status?  No      6) Review and evaluation of the individual abuse prevention plan: yes      Guide to Risk Ratings for Suicidality:   IDEATION: Active thoughts of suicide? INTENT: Intent to follow on suicide? PLAN: Plan to follow through on suicide? Level of Risk:   IF Yes Yes Yes Patient = High Emergent   IF Yes Yes No Patient = High Urgent/Non-Emergent   IF Yes No No Patient = Moderate Non-Urgent   IF No No   No Patient = Low Risk   The patient's ADDITIONAL RISK FACTORS and lack of PROTECTIVE FACTORS may increase their overall suicide risk ratings.     Patient's/client's current risk rating:  Low Risk      ASAM Risk Rating:    Dimension 1 Risk 0; Pt reports last use as " "11/02/2020.  Pt reports experiencing withdrawal symptoms of anxiety, irritability, muscle pain, itching and confusion, this past week. Patient was admitted to a medical detox unit on 11/02/2020 and was stabilized on admission.    Dimension 2 Risk 0; Pt denies medical problems this past week. Patient reports having a PCP, Dr. Ibanez through [facility] in [place]. Patient appears able to access medical services as needed.  Pt attended lecture given by LP nurse on TB, and Hep ABC on 11/07/2020.    Dimension 3 Risk 2; Patient reports a mental health diagnosis of Anxiety,  Depression, Bipolar Disorder, and Substance Use Disorder. Patient reports taking medication and that her provider is [facility]. Pt's suicide risk assessment on admission was \"Very low risk\". Pt denies any thoughts of self-harm or suicide ideation. Pt's current is CGI 4/4.  Pt reports her mood last week was dysregulated mostly sad, angry, and anxious. She reports she was stressed out with detox, and starting treatment. Coping skills to manage stress used were distraction, and reaching out.     Dimension 4 Risk 1; Pt reports her motivation this week is \"Myself\", and Partner\". Patient displays verbal compliance and internal motivation , however may also have external motivation from her family.    Dimension 5 Risk 4; Patient reports issues of mental health with limited periods of sobriety and has not been able to maintain sobriety on her own.  Pt reports craving at 9/10, ten being high. The coping skill she used were reaching out, using the wave, and listening to music. Pt participated in the spirituality group, facilitated by Estrella Flores with staff counselor present during group.   Dimension 6  Risk 3; Patient reports that she currently is unemployed and living on her own. Patient appears to lack meaningful structured activities. Pt, while in treatment is spending free time with female peers and attending at least 3 zoom 12-step meetings weekly " while in LP.  Pt participated in weekend workshop on relationships and completed all activities. Pt reports aftercare plans post LP is to continue treatment with IOP at [facility].     Family Involvement:   Pt reports having communicated with loved ones this past week.    Data:   offered feedback good insight client did participate    Intervention:   Aftercare planning  Behavior modification  Counselor feedback  Education  Emotional management  Group feedback  Motivational Enhancement Therapy  Relapse prevention  Twelve Step facilitation  Mental health education      Assessment:   Stages of Change Model  Contemplation    Appears/Sounds:  Cooperative  Motivated      Plan:  Monitor emotional/physical health  Continue to work on treatment plan goals    CECIL Dunn

## 2020-11-10 NOTE — GROUP NOTE
Group Therapy Documentation    PATIENT'S NAME: Flower Logan  MRN:   [id number]  :   1990  ACCT. NUMBER: [id number]  DATE OF SERVICE: 11/10/20  START TIME:  3:00 PM  END TIME:  4:00 PM  FACILITATOR(S): Kayla Shea; Dimple [facility]ivas [facility]  TOPIC: BEH Group Therapy  Number of patients attending the group:  6  Group Length:  1 Hours    Group Therapy Type: Recovery strategies and Daily living/independence skills    Summary of Group / Topics Discussed:    Relationship/socialization and Balanced lifestyle      Group Attendance:  Attended group session    Patient's response to the group topic/interactions:  cooperative with task    Patient appeared to be Actively participating and Attentive.        Client specific details:  Flower was attentive and appropriate in group.

## 2020-11-10 NOTE — GROUP NOTE
Group Therapy Documentation    PATIENT'S NAME: Flower Logan  MRN:   [id number]  :   1990  ACCT. NUMBER: [id number]  DATE OF SERVICE: 11/10/20  START TIME: 12:30 PM  END TIME:  2:30 PM  FACILITATOR(S): Ebony Sarmiento LADC; Estrella Benjamin  TOPIC: BEH Group Therapy  Number of patients attending the group:  6  Group Length:  2 Hours    Group Therapy Type: Emotion processing and Health and wellbeing     Summary of Group / Topics Discussed:    Spiritual Care      Group Attendance:  Attended group session    Patient's response to the group topic/interactions:  cooperative with task and listened actively    Patient appeared to be Attentive and Engaged.        Client specific details:  Pt participated in the spirituality group and engaged in activities using senses, with peers and facilitator Estrella Read. .

## 2020-11-10 NOTE — GROUP NOTE
Group Therapy Documentation    PATIENT'S NAME: Flower Logan  MRN:   [id number]  :   1990  ACCT. NUMBER: [id number]  DATE OF SERVICE: 11/10/20  START TIME:  9:00 AM  END TIME: 11:00 AM  FACILITATOR(S): Dimple Rivas LADC  TOPIC: BEH Group Therapy  Number of patients attending the group: 6  Group Length:  2 Hours    Group Therapy Type: Daily living/independence skills    Summary of Group / Topics Discussed:    Sober coping skills, Emotion processing.      Group Attendance:  Attended group session    Patient's response to the group topic/interactions:  cooperative with task    Patient appeared to be Actively participating and Engaged.        Client specific details: Flower reported being anxious but glad to be in treatment this AM during check- in.. She participated in group discussion, and gave appropriate feedback to group members who shared their assignments.

## 2020-11-10 NOTE — PROGRESS NOTES
Name: Flower Logan  Date: 11/10/2020  Medical Record: [id number]    Envelope Number: [id number]    List of Contents (List each item separately in new row):   Hydroxyzine 50 mg tabs bottle    Admission:  I am responsible for any personal items that are not sent to the safe or pharmacy.  [facility] is not responsible for loss, theft or damage of any property in my possession.      Patient Signature:  ___________________________________________       Date/Time:__________________________    Staff Signature: __________________________________       Date/Time:__________________________    2nd Staff person, if patient is unable/unwilling to sign:      __________________________________________________________       Date/Time: __________________________      Discharge:  [facility] has returned all of my personal belongings:    Patient Signature: ________________________________________     Date/Time: ____________________________________    Staff Signature: ______________________________________     Date/Time:_____________________________________

## 2020-11-11 ENCOUNTER — HOSPITAL ENCOUNTER (OUTPATIENT)
Dept: BEHAVIORAL HEALTH | Facility: CLINIC | Age: 30
End: 2020-11-11
Attending: FAMILY MEDICINE
Payer: COMMERCIAL

## 2020-11-11 VITALS — TEMPERATURE: 98.2 F | OXYGEN SATURATION: 100 %

## 2020-11-11 PROCEDURE — H2035 A/D TX PROGRAM, PER HOUR: HCPCS | Mod: HQ

## 2020-11-11 PROCEDURE — H2035 A/D TX PROGRAM, PER HOUR: HCPCS

## 2020-11-11 PROCEDURE — 1002N00001 HC LODGING PLUS FACILITY CHARGE ADULT

## 2020-11-11 NOTE — GROUP NOTE
Group Therapy Documentation    PATIENT'S NAME: Flower Logan  MRN:   [id number]  :   1990  ACCT. NUMBER: [id number]  DATE OF SERVICE: 20  START TIME: 12:30 PM  END TIME:  2:30 PM  FACILITATOR(S): Dimple Rivas LADC  TOPIC: BEH Group Therapy  Number of patients attending the group: 6  Group Length:  2 Hours    Group Therapy Type: Recovery strategies and Emotion processing    Summary of Group / Topics Discussed:    Recovery Principles, Sober coping skills, Co-occurring illnesses symptom management, and Emotions/expression      Group Attendance:  Attended group session    Patient's response to the group topic/interactions:  cooperative with task    Patient appeared to be Actively participating and Engaged.        Client specific details: Flower was very active in group. She participated in the discussion on how daily emotions, attitudes or actions, positive or negative move our recovery towards sobriety or relapse. She also completed a worksheet and shared in group.

## 2020-11-11 NOTE — PROGRESS NOTES
INDIVIDUAL SESSION SUMMARY    D) Met with client on 11/11/20 from 3:00-3:55pm. Client is in the [facility].  Client's Statement of Presenting Concern:  Client spoke of stressors including: financial hardship and relationship issues. Client reported mood has been: motivated.     Social History:  Client spoke about childhood including growing up with both parents and a younger sister; that her sister lives in [place] and both parents live in [place]. Client shared that she'd lived with her parents on two occassions over the last few months and that they have been involved in getting her to this program.    Client reported to end a 10 yr relationship in June and start a new relationship in August. Client shared that she and her ex had lived together for the last 8 years and had a cat they shared. Client shared that there has been yelling, pushing and shoving in both of her recent relationships when under the influence. Client shared that both of her recent partners also struggle with ILDA. Client shared that she intends on resuming a relationship with her current S.O. when she completes this program. Client identified stable and meaningful social connections including: several friends and her sister.     Client described their housing situation as living, alone, in a new apartment she moved into in September in [place]. Client reported that her parents are helping with her rent. 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      Mental Health History:  Client reported to has a medication provider at [facility] in [place] who has diagnosed her with Bipolar disorder. Client shared that she has been on a mood stabilizer and has been impulsive in several areas of her life including financially and other reckless behaviors.       Safety: denies suicidal or homicidal ideation, plan, or intent.    Chemical Health History:  Client reported that this is her first  treatment program; that her primary DOC is alcohol but that she has a hx of cross addiction with cocaine and meth. Client shared that this is the longest she has been sober in 10 years. Client shared she is still having intense cravings.     Significant Losses / Trauma / Abuse / Neglect Issues:  Client reported past traumatic experience(s) or abuse including: ending a 10 year relationship in June 2020.     Medical Issues:  Client reports the following current medical concerns: concerns that she will address with the LP nurse. Client reported no issues with sleep.    Patient's Strengths and Limitations:  Client identified the following strengths or resources that will help them succeed in counseling: friends / good social support, family support, intelligence and motivation. Client identified the following supports: family, friends and sober support group / recovery support . Things that may interfere with the clients success in counseling include: financial hardship and relationship stressors. Client spoke about their aftercare plans including: [facility] in [place] 30 hr a week IOP. Client reported self-care activities including: talking with her boyfriend, sister and mother.     I) Individual session with client. Provided client with verbal interventions including: validation, compassion and support. Discussed the importance of recovery behaviors such as utilizing sponsorship, sober support network, going to 12-step meetings, daily rituals, and goal setting. Discussed the benefits of: healthy boundaries with S.O.. Provided psycho-education on: codependency.     A) Client appears to lack insight into her ILDA and relationship patterns and relapse prevention. Client appears detached from her emotions and to lack skills regulating impulses. Client appears to lack a sober support network. Client appears seek a new daily structure and routine. Client appears to have mixed motivation and would benefit from continuing  support to help with processing past losses, emotional regulation, impulse control, relapse prevention, codependency, and increasing self-esteem.     P) Next session is scheduled for 11/13/20.     CHAGO Alberts  11/11/2020

## 2020-11-11 NOTE — ADDENDUM NOTE
Encounter [facility]: [facility]: 11/11/2020 9:47 AM   Actions taken: Clinical Note Signed

## 2020-11-11 NOTE — ADDENDUM NOTE
Encounter add[facility] by: [facility] on: 11/11/2020 9:54 AM   Actions taken: Clinical Note Signed

## 2020-11-11 NOTE — GROUP NOTE
Group Therapy Documentation    PATIENT'S NAME: Flower Logan  MRN:   [id number]  :   1990  ACCT. NUMBER: [id number]  DATE OF SERVICE: 20  START TIME:  8:30 AM  END TIME:  9:30 AM  FACILITATOR(S): Fran Otto LADC  TOPIC: BEH Group Therapy  Number of patients attending the group:  6  Group Length:  1 Hours    Group Therapy Type: Recovery strategies    Summary of Group / Topics Discussed:    Cognitive behavioral therapy skills      Group Attendance:  Attended group session    Patient's response to the group topic/interactions:  cooperative with task    Patient appeared to be Attentive.        Client specific details:  Flower gave appropriate feedback..

## 2020-11-11 NOTE — GROUP NOTE
"Group Therapy Documentation    PATIENT'S NAME: Flower Logan  MRN:   [id number]  :   1990  ACCT. NUMBER: [id number]  DATE OF SERVICE: 20  START TIME: 10:00  END TIME: 11:30  FACILITATOR(S): Scarlett Sapp LADC  TOPIC: BEH Group Therapy  Number of patients attending the group:  5  Group Length:  2 Hours    Group Therapy Type: Recovery strategies    Summary of Group / Topics Discussed:    Recovery Principles      Group Attendance:  Attended group session    Patient's response to the group topic/interactions:  cooperative with task and discussed personal experience with topic    Patient appeared to be Actively participating.        Client specific details:  Patient shares that she is feeling, \"energetic and motivated\" today. She has completed her first  treatment plan assignment, \"The Book of Me\". She was actively engaged within the group discussion on key elements of recovery principles and increasing motivation for recovery. Patient reports looking into potential aftercare options and is interested in begining the process of solidifying her plans for aftercare .   "

## 2020-11-12 ENCOUNTER — HOSPITAL ENCOUNTER (OUTPATIENT)
Dept: BEHAVIORAL HEALTH | Facility: CLINIC | Age: 30
End: 2020-11-12
Attending: FAMILY MEDICINE
Payer: COMMERCIAL

## 2020-11-12 ENCOUNTER — TELEPHONE (OUTPATIENT)
Dept: BEHAVIORAL HEALTH | Facility: CLINIC | Age: 30
End: 2020-11-12

## 2020-11-12 DIAGNOSIS — Z11.3 SCREEN FOR STD (SEXUALLY TRANSMITTED DISEASE): ICD-10-CM

## 2020-11-12 DIAGNOSIS — Z11.3 SCREEN FOR STD (SEXUALLY TRANSMITTED DISEASE): Primary | ICD-10-CM

## 2020-11-12 LAB — HCG UR QL: NEGATIVE

## 2020-11-12 PROCEDURE — 87389 HIV-1 AG W/HIV-1&-2 AB AG IA: CPT | Performed by: PEDIATRICS

## 2020-11-12 PROCEDURE — 81025 URINE PREGNANCY TEST: CPT | Performed by: PEDIATRICS

## 2020-11-12 PROCEDURE — 36415 COLL VENOUS BLD VENIPUNCTURE: CPT | Performed by: PEDIATRICS

## 2020-11-12 PROCEDURE — H2035 A/D TX PROGRAM, PER HOUR: HCPCS | Mod: HQ

## 2020-11-12 PROCEDURE — 87491 CHLMYD TRACH DNA AMP PROBE: CPT | Performed by: PEDIATRICS

## 2020-11-12 PROCEDURE — 87591 N.GONORRHOEAE DNA AMP PROB: CPT | Performed by: PEDIATRICS

## 2020-11-12 PROCEDURE — 1002N00001 HC LODGING PLUS FACILITY CHARGE ADULT

## 2020-11-12 PROCEDURE — 86780 TREPONEMA PALLIDUM: CPT | Performed by: PEDIATRICS

## 2020-11-12 NOTE — TELEPHONE ENCOUNTER
"Pt coming to writer explaining she had multiple recent unprotected sexual encounters with 3 men between July-October (July - October).    She is requesting STD testing and would also like a pregnancy test.  She \"thinks\" her last mensus was 3 weeks ago    Dr Juarez can you please write order and then we can send pt to outpt lab?    [facility] Outpt lab only does urine and blood STD testing.    Pt PCP [place] Provider Jose A Godinez is currently on 'leave' per pt report.  So pt can follow up after her stay at LP as recommended.  Pt reports a comfortable/trusting relationship with this provider.         "

## 2020-11-12 NOTE — GROUP NOTE
"Group Therapy Documentation    PATIENT'S NAME: Flower Logan  MRN:   [id number]  :   1990  ACCT. NUMBER: [id number]  DATE OF SERVICE: 20  START TIME:  9:00 AM  END TIME: 11:00 AM  FACILITATOR(S): Ebony Sarmiento LADC  TOPIC: BEH Group Therapy  Number of patients attending the group:  8  Group Length:  2 Hours    Group Therapy Type: Emotion processing and Health and wellbeing     Summary of Group / Topics Discussed:    Recovery Principles, Mindfulness/Relaxation, and Emotions/expression      Group Attendance:  Attended group session    Patient's response to the group topic/interactions:  cooperative with task and discussed personal experience with topic    Patient appeared to be Actively participating, Attentive and Engaged.        Client specific details:  Patient participated in meditation and deep breathing. Patient gave peers supportive feedback. Patient presented \"Book of Me\" assignment in group. She shared negative self-talk and turned them into positive statements. Patient seems to have shame, evidenced by negative self-talk. She was open to feedback and discussion about current relationship.  "
Group Therapy Documentation    PATIENT'S NAME: Flower Logan  MRN:   [id number]  :   1990  ACCT. NUMBER: [id number]  DATE OF SERVICE: 20  START TIME: 12:30 PM  END TIME:  2:30 PM  FACILITATOR(S): Scarlett Sapp LADC  TOPIC: BEH Group Therapy  Number of patients attending the group:  8  Group Length:  2 Hours    Group Therapy Type: Recovery strategies    Summary of Group / Topics Discussed:    Recovery Principles, Sober coping skills, Leisure explorations/use of leisure time, and Relapse prevention      Group Attendance:  Attended group session    Patient's response to the group topic/interactions:  cooperative with task and discussed personal experience with topic    Patient appeared to be Actively participating.        Client specific details:  Patient participated in creating a tree of addiction and a tree of recovery. Patient was able to identify areas that contributed to the roots and the soil of her addiction. She also was able to list positive outcomes of recovery as well as coping strategies utilized within the recovery process.  Patient then presented her assignment in group and received favorable feedback from her peers.  
Psychoeducation Group Documentation    PATIENT'S NAME: Flower Logan  MRN:   [id number]  :   1990  ACCT. NUMBER: [id number]  DATE OF SERVICE: 20  START TIME:  3:00 PM  END TIME:  4:00 PM  FACILITATOR(S): Dimple Rivas LADC; Fe Connell LADC  TOPIC: BEH Pyschoeducation  Number of patients attending the group:  24  Group Length:  1 Hours    Skills Group Therapy Type: Healthy behaviors development    Summary of Group / Topics Discussed:    Balanced lifestyle skills          Group Attendance:  Attended group session    Patient's response to the group topic/interactions:  cooperative with task    Patient appeared to be Engaged.         Client specific details:  Flower was appropriate during Nutrition lecture.   .    
[Negative] : Musculoskeletal

## 2020-11-13 ENCOUNTER — HOSPITAL ENCOUNTER (OUTPATIENT)
Dept: BEHAVIORAL HEALTH | Facility: CLINIC | Age: 30
End: 2020-11-13
Attending: FAMILY MEDICINE
Payer: COMMERCIAL

## 2020-11-13 VITALS — TEMPERATURE: 98.4 F | OXYGEN SATURATION: 97 %

## 2020-11-13 VITALS — TEMPERATURE: 97.7 F | OXYGEN SATURATION: 97 %

## 2020-11-13 LAB
C TRACH DNA SPEC QL NAA+PROBE: NEGATIVE
HIV 1+2 AB+HIV1 P24 AG SERPL QL IA: NONREACTIVE
N GONORRHOEA DNA SPEC QL NAA+PROBE: NEGATIVE
SPECIMEN SOURCE: NORMAL
SPECIMEN SOURCE: NORMAL
T PALLIDUM AB SER QL: NONREACTIVE

## 2020-11-13 PROCEDURE — 1002N00001 HC LODGING PLUS FACILITY CHARGE ADULT

## 2020-11-13 PROCEDURE — H2035 A/D TX PROGRAM, PER HOUR: HCPCS | Mod: HQ

## 2020-11-13 PROCEDURE — H2035 A/D TX PROGRAM, PER HOUR: HCPCS

## 2020-11-13 NOTE — GROUP NOTE
Group Therapy Documentation    PATIENT'S NAME: Flower Logan  MRN:   [id number]  :   1990  ACCT. NUMBER: [id number]  DATE OF SERVICE: 20  START TIME: 12:30 PM  END TIME:  2:30 PM  FACILITATOR(S): Fe Connell LADC  TOPIC: BEH Group Therapy  Number of patients attending the group: 8  Group Length:  2 Hours    Group Therapy Type: Emotion processing    Summary of Group / Topics Discussed:    Relationship/socialization and Self-care activities      Group Attendance:  Attended group session    Patient's response to the group topic/interactions:  cooperative with task    Patient appeared to be Actively participating, Attentive and Engaged.        Client specific details: Flower was present for one hour of afternoon group.  She attended an individual appointment after attending.

## 2020-11-13 NOTE — GROUP NOTE
Group Therapy Documentation    PATIENT'S NAME: Flower Logan  MRN:   [id number]  :   1990  ACCT. NUMBER: [id number]  DATE OF SERVICE: 20  START TIME:  9:00 AM  END TIME: 11:00 AM  FACILITATOR(S): Scarlett Sapp LADC  TOPIC: BEH Group Therapy  Number of patients attending the group: 8  Group Length:  2 Hours    Group Therapy Type: Recovery strategies and Health and wellbeing     Summary of Group / Topics Discussed:    Sober coping skills, Balanced lifestyle, Emotions/expression, Relapse prevention, and Self-care activities      Group Attendance:  Attended group session    Patient's response to the group topic/interactions:  cooperative with task and discussed personal experience with topic    Patient appeared to be Actively participating.        Client specific details:  :  Patient reports that she is feeling,  regret  today. She reports that she completed her consequences  treatment plan assignment. Patient was an active participant within the group and gave excellent feedback to her peers.

## 2020-11-14 ENCOUNTER — HOSPITAL ENCOUNTER (OUTPATIENT)
Dept: BEHAVIORAL HEALTH | Facility: CLINIC | Age: 30
End: 2020-11-14
Attending: FAMILY MEDICINE
Payer: COMMERCIAL

## 2020-11-14 VITALS — OXYGEN SATURATION: 100 % | TEMPERATURE: 98 F

## 2020-11-14 PROCEDURE — H2035 A/D TX PROGRAM, PER HOUR: HCPCS | Mod: HQ

## 2020-11-14 PROCEDURE — 1002N00001 HC LODGING PLUS FACILITY CHARGE ADULT

## 2020-11-14 NOTE — GROUP NOTE
Group Therapy Documentation    PATIENT'S NAME: Flower Logan  MRN:   [id number]  :   1990  ACCT. NUMBER: [id number]  DATE OF SERVICE: 20  START TIME:  9:00 AM  END TIME: 11:00 AM  FACILITATOR(S): Ebony Sarmiento LADC; Katie Florez LADC  TOPIC: BEH Group Therapy  Number of patients attending the group:  23  Group Length:  2 Hours    Group Therapy Type: Recovery strategies    Summary of Group / Topics Discussed:    Recovery Principles, Co-occurring illnesses symptom management, and Relapse prevention      Group Attendance:  Attended group session    Patient's response to the group topic/interactions:  cooperative with task    Patient appeared to be Actively participating, Attentive and Engaged.        Client specific details:  Patient participated in activity of mental health and substance abuse.

## 2020-11-14 NOTE — GROUP NOTE
Group Therapy Documentation    PATIENT'S NAME: Flower Logan  MRN:   [id number]  :   1990  ACCT. NUMBER: [id number]  DATE OF SERVICE: 20  START TIME: 12:30 PM  END TIME:  2:30 PM  FACILITATOR(S): Kayla Shea; Katie Florez LADC; Ebony Sarmiento LADC  TOPIC: BEH Group Therapy  Number of patients attending the group:    Group Length:  2 Hours    Group Therapy Type: Recovery strategies    Summary of Group / Topics Discussed:    Recovery Principles and Relapse prevention      Group Attendance:  Attended group session    Patient's response to the group topic/interactions:  cooperative with task and listened actively    Patient appeared to be Actively participating, Attentive and Engaged.        Client specific details:  Patient participated in afternoon relapse prevention workshop and completed all activities.

## 2020-11-15 ENCOUNTER — HOSPITAL ENCOUNTER (OUTPATIENT)
Dept: BEHAVIORAL HEALTH | Facility: CLINIC | Age: 30
End: 2020-11-15
Attending: FAMILY MEDICINE
Payer: COMMERCIAL

## 2020-11-15 VITALS — OXYGEN SATURATION: 98 % | TEMPERATURE: 98.3 F

## 2020-11-15 PROCEDURE — 1002N00001 HC LODGING PLUS FACILITY CHARGE ADULT

## 2020-11-15 PROCEDURE — H2035 A/D TX PROGRAM, PER HOUR: HCPCS | Mod: HQ

## 2020-11-15 NOTE — GROUP NOTE
Group Therapy Documentation    PATIENT'S NAME: Flower Logan  MRN:   [id number]  :   1990  ACCT. NUMBER: [id number]  DATE OF SERVICE: 11/15/20  START TIME:  8:45 AM  END TIME: 10:15 AM  FACILITATOR(S): Luiza Mann RN; Kayla Shea; Ebony Sarmiento LADC  TOPIC: BEH Group Therapy  Number of patients attending the group:  24  Group Length:  1.5 Hours    Group Therapy Type: Daily living/independence skills and Health and wellbeing     Summary of Group / Topics Discussed:    Recovery Principles, Balanced lifestyle, and Self-care activities      Group Attendance:  Attended group session    Patient's response to the group topic/interactions:  cooperative with task and discussed personal experience with topic    Patient appeared to be Actively participating, Attentive and Engaged.        Client specific details:  Patient attended in self-care workshop, presented by Luiza Mann RN for LP. Patient participated in completed activities, including meditation.

## 2020-11-15 NOTE — GROUP NOTE
Group Therapy Documentation    PATIENT'S NAME: Flower Logan  MRN:   [id number]  :   1990  ACCT. NUMBER: [id number]  DATE OF SERVICE: 11/15/20  START TIME:  1:00 PM  END TIME:  2:00 PM  FACILITATOR(S): Kayla Shea; Ebony Sarmiento LADC  TOPIC: BEH Group Therapy  Number of patients attending the group:  8  Group Length:  1 Hours    Group Therapy Type: Recovery strategies and Health and wellbeing     Summary of Group / Topics Discussed:    Recovery Principles, Sober coping skills, and Relapse prevention      Group Attendance:  Attended group session    Patient's response to the group topic/interactions:  cooperative with task and listened actively    Patient appeared to be Actively participating, Attentive and Engaged.        Client specific details:  Patient participated in tobacco cessation activity. Patient seemed receptive to information and education.

## 2020-11-16 ENCOUNTER — HOSPITAL ENCOUNTER (OUTPATIENT)
Dept: BEHAVIORAL HEALTH | Facility: CLINIC | Age: 30
End: 2020-11-16
Attending: FAMILY MEDICINE
Payer: COMMERCIAL

## 2020-11-16 VITALS — OXYGEN SATURATION: 97 % | TEMPERATURE: 96.9 F

## 2020-11-16 PROCEDURE — H2035 A/D TX PROGRAM, PER HOUR: HCPCS | Mod: HQ

## 2020-11-16 PROCEDURE — 1002N00001 HC LODGING PLUS FACILITY CHARGE ADULT

## 2020-11-16 NOTE — PROGRESS NOTES
Patient:  Flower Logan            Adult CD Progress Note and Treatment Plan Review     Attendance  Please refer to OP BEH CD Adult Attendance Record Documentation Flowsheet    Support group attended this week: yes    Reporting sobriety:  Yes    Treatment Plan     Treatment Plan Review competed on: 11/16/2020  This review covers 11/10/2020-11/16/2020      Client preferred learning style: Visual  Hands on  Demonstration    Staff Members contributing  Dimple Rivas [facility]; Fe Connell [facility]; Hood Otto [facility]; Brianne Shirley , Intern Scarlett Sapp [facility]; and Ebony Sarmiento [facility].       Received Supervision: Yes    Client: contributed to goals and plan.    Client received copy of plan/revised plan: Yes    Client agrees with plan/revised plan: Yes    Changes to Treatment Plan: No    New Goals added since last review No    Goals worked on since last review relapse prevention, tobacco cessation, self-care, consequences, spirituality    Strategies effective: yes    Strategies need these changes: Continue to address goals.    1) Care Coordination Activities:  aftercare  2) Medical, Mental Health and other appointments the client attended: 1:1 with therapist  3) Medication issues: none noted  4) Physical and mental health problems: no, seen for 1:1 with Marlen Lara, staff psychotherapist.    5) Any changes in Vulnerable Adult Status?  No    If yes, add to treatment plan and individual abuse prevention plan.  6) Review and evaluation of the individual abuse prevention plan: Yes      Guide to Risk Ratings for Suicidality:   IDEATION: Active thoughts of suicide? INTENT: Intent to follow on suicide? PLAN: Plan to follow through on suicide? Level of Risk:   IF Yes Yes Yes Patient = High Emergent   IF Yes Yes No Patient = High Urgent/Non-Emergent   IF Yes No No Patient = Moderate Non-Urgent   IF No No   No Patient = Low Risk   The patient's ADDITIONAL RISK FACTORS and lack of PROTECTIVE FACTORS may increase their  "overall suicide risk ratings.     Patient's/client's current risk rating: Very Low Risk    ASAM Risk Rating:    Dimension 1 Risk 0 Pt reports last use as 11/2/2020. Pt denies experiencing withdrawal symptoms this past week.     Dimension 2 Risk 0 Pt does not endorse chronic medical concerns, and seems fully functioning to seek medical attention as needed. Pt is screened for COVID-19 daily and temperature and oxygen level twice daily. Pt attended lecture given by  nurse on, self-care.    Dimension 3 Risk 2 Pt's suicide risk assessment on admission was \"Very low risk.\"  Pt denies thoughts of self-harm or suicide ideation. Pt's current CGI 5/5. Pt met with Marlen Lara, staff psychotherapist for support and healing.  Pt reports stress level has increased due to issues with partner and the coping skill to manage stress used was distraction.    Dimension 4 Risk 1 Pt reports her motivation this week is myself and a desire to improve my life.  Pt completed the consequences assignment and will present this week.    Dimension 5 Risk 4  Pt reports craving 9/10, ten being high. The coping skill she used distraction. Pt participated in the spirituality group, facilitated by Estrella Flores and Ebony Sarmiento, CECIL  was present during group. Pt participated in weekend workshop on relapse prevention, tobacco cessation and completed all activities.    Dimension 6  Risk 3 Pt is spending free time with female peers and attending at least 3 virtual  12-step meetings weekly while in .  Pt reported she wanted to attend [facility] for aftercare, discussed it being an [facility] and the similarity with this program. Pt to call insurance to verify she can attend at the same level or start at a lower number of hours.    Any changes in Vulnerable Adult Status?  No  If yes, add to treatment plan and individual abuse prevention plan.    Family Involvement:   Pt reports having contact with loved ones this past week.    Data:   offered feedback good " insight client did actively participate  11  Intervention:   Aftercare planning  Counselor feedback  Education  Group feedback  Relapse prevention  Twelve Step facilitation  Mental health education      Assessment:   Stages of Change Model  Contemplation    Appears/Sounds:  Cooperative  Motivated  Engaged  Pt appears determined to attend [facility] and not consider other aftercare options.    Plan:  Focus on recovery environment  Monitor emotional/physical health      CECIL Aviles

## 2020-11-16 NOTE — GROUP NOTE
Group Therapy Documentation    PATIENT'S NAME: Flower Logan  MRN:   [id number]  :   1990  ACCT. NUMBER: [id number]  DATE OF SERVICE: 20  START TIME:  9:00 AM  END TIME: 11:00 AM  FACILITATOR(S): Fe Connell LADC; Brianne Shirley CD Intern  TOPIC: BEH Group Therapy  Number of patients attending the group:  8  Group Length:  2 Hours    Group Therapy Type: Emotion processing    Summary of Group / Topics Discussed:    Relationship/socialization and Emotions/expression      Group Attendance:  Attended group session    Patient's response to the group topic/interactions:  gave appropriate feedback to peers and listened actively    Patient appeared to be Attentive and Passively engaged.        Client specific details: During check-in pt reported feeling depressed, irritable, and hurt, and shared that her most significant change after entering recovery has been achieving a regular sleep schedule. She offered supportive feedback to peers who shared childhood trauma and to a peer who shared an assignment.

## 2020-11-16 NOTE — GROUP NOTE
Group Therapy Documentation    PATIENT'S NAME: Flower Logan  MRN:   [id number]  :   1990  ACCT. NUMBER: [id number]  DATE OF SERVICE: 20  START TIME: 12:30 PM  END TIME:  2:30 PM  FACILITATOR(S): Fe Connell LADC; Brianne Shirley CD Intern  TOPIC: BEH Group Therapy  Number of patients attending the group:  8  Group Length:  2 Hours    Group Therapy Type: Recovery strategies and Emotion processing    Summary of Group / Topics Discussed:    Emotions/expression and Relapse prevention      Group Attendance:  Attended group session    Patient's response to the group topic/interactions:  cooperative with task, gave appropriate feedback to peers and listened actively    Patient appeared to be Actively participating, Attentive and Engaged.        Client specific details: Pt participated in brief meditation, then a in discussion on a reading about victimhood or taking responsibility from  Letting Go.  She offered supportive feedback to a peer who shared an assignment, and a peer who shared an emotional, exigent situation with the group.

## 2020-11-17 ENCOUNTER — HOSPITAL ENCOUNTER (OUTPATIENT)
Dept: BEHAVIORAL HEALTH | Facility: CLINIC | Age: 30
End: 2020-11-17
Attending: FAMILY MEDICINE
Payer: COMMERCIAL

## 2020-11-17 VITALS — TEMPERATURE: 97.5 F | OXYGEN SATURATION: 99 %

## 2020-11-17 PROCEDURE — H2035 A/D TX PROGRAM, PER HOUR: HCPCS | Mod: HQ

## 2020-11-17 PROCEDURE — H2035 A/D TX PROGRAM, PER HOUR: HCPCS

## 2020-11-17 PROCEDURE — 1002N00001 HC LODGING PLUS FACILITY CHARGE ADULT

## 2020-11-17 NOTE — GROUP NOTE
Group Therapy Documentation    PATIENT'S NAME: Flower Logan  MRN:   [id number]  :   1990  ACCT. NUMBER: [id number]  DATE OF SERVICE: 20  START TIME:  9:00 AM  END TIME: 11:00 AM  FACILITATOR(S): Ebony Sarmiento LADC; Brianne Shirley CD Intern  TOPIC: BEH Group Therapy  Number of patients attending the group:  7  Group Length:  2 Hours    Group Therapy Type: Recovery strategies and Emotion processing    Summary of Group / Topics Discussed:    Sober coping skills, Emotions/expression, and Relapse prevention      Group Attendance:  Attended group session    Patient's response to the group topic/interactions:  cooperative with task, expressed understanding of topic and listened actively    Patient appeared to be Actively participating, Attentive and Engaged.     Client specific details: During check-in pt reported feeling motivated, hopeful and excited, and shared that she strives for balance between facts and feelings when making decisions. She offered supportive feedback on a group member's relapse prevention plan and participated in a graduation ceremony. Pt had permission to leave group early for an appointment.

## 2020-11-17 NOTE — GROUP NOTE
Group Therapy Documentation    PATIENT'S NAME: Flower Logan  MRN:   [id number]  :   1990  ACCT. NUMBER: [id number]  DATE OF SERVICE: 20  START TIME: 12:30 PM  END TIME:  2:30 PM  FACILITATOR(S): Fe Connell LADC  TOPIC: BEH Group Therapy  Number of patients attending the group: 7  Group Length:  2 Hours    Group Therapy Type: Emotion processing    Summary of Group / Topics Discussed:    Spiritual Care      Group Attendance:  Attended group session    Patient's response to the group topic/interactions:  cooperative with task    Patient appeared to be Actively participating, Attentive and Engaged.        Client specific details: Flower was an active participant in Spiritual Care group with Estrella Benjamin.

## 2020-11-17 NOTE — IP AVS SNAPSHOT
MRN:[id number]                      After Visit Summary   11/17/2020    Flower Logan    MRN: [id number]           Visit Information        Provider Department      11/17/2020  7:00 AM ADULT [facility]        Your next 10 appointments already scheduled    Nov 18, 2020  7:00 AM  Treatment with ADULT [facility] PL[facility] E  [facility] ([facility]) [address] [address]  [phone]      Nov 19, 2020  7:00 AM  Treatment with ADULT [facility] PL[facility] E  [facility] ([facility]) [address] [address]  [phone]      Nov 20, 2020  7:00 AM  Treatment with ADULT [facility] PL[facility] E  [facility] ([facility]) [facility] [address] [address]  [phone]      Nov 21, 2020  7:00 AM  Treatment with ADULT LODGING PLUS E  [facility] ([facility]) [address] [address]  [phone]      Nov 22, 2020  7:00 AM  Treatment with ADULT LODGING PLUS E  [facility] ([facility]) [facility] [address] [address]  [phone]      Nov 23, 2020  7:00 AM  Treatment with ADULT LODGING PLUS E  [facility] ([facility]) [address] [address]  [phone]      Nov 24, 2020  7:00 AM  Treatment with ADULT LODGING PLUS E  [facility] ([facility] [address] [address]  [phone]      Nov 25, 2020  7:00 AM  Treatment with ADULT LODGING PLUS E  [facility] ([facility]) [address] [address]  [phone]      Nov 26, 2020  7:00 AM  Treatment with ADULT LODGING PLUS E  [facility] ([facility]) [address] [address]  [phone]         MyChart Information    [employer] gives you secure access to your electronic health record. If you see a primary care provider, you can also send messages to your care team and make appointments. If you have questions, please call your primary care clinic.  If you do not have a primary care provider, please call [phone] and they will assist you.       Care EveryWhere ID    This is your Care EveryWhere ID. This could be used by other organizations to access your [place] medical records  [id number]       Equal Access to Services    CHELI HUFF : Kim Ruggiero, blayne cisse, lizy early. So [facility] [phone].    ATENCIÓN: Si habla español, tiene a serrano disposición servicios gratuitos de asistencia lingüística. Chapin al [phone].    We comply with applicable federal and state civil rights laws, including the [place] Human Rights Act. We do not discriminate on the basis of race, color, creed, Jew, national origin, marital status, age, disability, sex, sexual orientation, or gender identity.

## 2020-11-17 NOTE — GROUP NOTE
Group Therapy Documentation    PATIENT'S NAME: Flower Logan  MRN:   [id number]  :   1990  ACCT. NUMBER: [id number]  DATE OF SERVICE: 20  START TIME:  3:00 PM  END TIME:  4:00 PM  FACILITATOR(S): Fe Connell LADC; Jamie Milner LADC  TOPIC: BEH Group Therapy  Number of patients attending the group: 18  Group Length:  1 Hours    Group Therapy Type: Recovery strategies    Summary of Group / Topics Discussed:    Sober coping skills and Relationship/socialization      Group Attendance:  Attended group session    Patient's response to the group topic/interactions:  cooperative with task    Patient appeared to be Attentive and Engaged.        Client specific details: Flower was an active participant in Skills Group on building trust in relationships.

## 2020-11-17 NOTE — IP AVS SNAPSHOT
Medication List       Patient: SANDRITA SEXTON   : 1990   Physician: Ghazal Godinez Mai, MD           This is your record.  Keep this with you and show to your community pharmacist(s) and physician(s) at each visit.     Allergies:  SHELLFISH-DERIVED PRODUCTS - (reactions not documented)               Medications  Valid as of: 2020 - 11:53 AM    Generic Name Brand Name Tablet Size Instructions for use    Acetaminophen TYLENOL 325 MG Take 650 mg by mouth every 4 hours as needed for mild pain        Alum & Mag Hydroxide-Simeth MAALOX 200-200-20 MG/5ML Take 30 mLs by mouth every 6 hours as needed for indigestion        Gabapentin NEURONTIN 300 MG Take 1 capsule (300 mg) by mouth 3 times daily as needed (anxiety)        guaiFENesin ROBITUSSIN 20 mg/mL Take 10 mLs by mouth every 4 hours as needed for cough        hydrOXYzine HCl ATARAX 25 MG Take 1 tablet (25 mg) by mouth 3 times daily as needed for anxiety        Ibuprofen ADVIL/MOTRIN 200 MG Take 400 mg by mouth every 6 hours as needed for mild pain        lamoTRIgine LaMICtal 100 MG Take 100 mg by mouth daily        Loratadine CLARITIN 10 MG Take 10 mg by mouth daily as needed for allergies        Melatonin melatonin 3 MG Take 3 mg by mouth nightly as needed for sleep        Multiple Vitamins-Minerals THERA-VIT-M  Take 1 tablet by mouth daily        Naltrexone HCl DEPADE/REVIA 50 MG Take 1 tablet (50 mg) by mouth daily        Nicotine Polacrilex NICORETTE 2 MG Place 1-2 each (2-4 mg) inside cheek every hour as needed for other (nicotine withdrawal symptoms)        Propranolol HCl INDERAL LA 60 MG Take 60 mg by mouth daily        Sennosides-Docusate Sodium SENOKOT-S/PERICOLACE 8.6-50 MG Take 2 tablets by mouth daily as needed for constipation        Throat Lozenges CEPASTAT 14.5 MG Place 1 lozenge inside cheek every 2 hours as needed for moderate pain        traZODone HCl DESYREL 50 MG Take 1 tablet (50 mg) by mouth nightly as needed for  sleep        .           .           .           .

## 2020-11-17 NOTE — PROGRESS NOTES
"INDIVIDUAL SESSION SUMMARY    D) Met with client on 11/17/20 from 10:00-11:00am. Client is in the [facility]. Client spoke of her feelings of \"anger\" towards her ex and \"guilt\" when she thinks about her behaviors while in relationship with her ex. Client expressed feeling bad for not giving her ex much attention lately. Client shared that she doesn't feel good about not being fully honest with her parents about her current relationship and living arrangements; that she may offer her apartment to her current S.O. so he is not homeless. Client expressed uncertainty about current relationship because of his behaviors while the client has been is this program. Client spoke of not knowing if she will be able to see her family in [place] because of COVID-19. Client discussed her past history of restricting food while drinking and how it has been happening while in this program by skipping lunches.     I) Individual session with client. Provided client with verbal interventions including: validation and support. Discussed healthier eating habits. Spoke about unhealthy codependent relationship patterns where the client abandons her feelings, wants and needs in order to avoid being alone or feeling rejected.     A) Client appears to have insight into unhealthy relationship patterns and is challenged to prioritize her wants, feelings and needs. Client appears less detached from her emotions and to be more honest about her relationship experiences. Client appears to understand the importance of creating a sober support network. Client appears seek a new daily structure and routine. Client appears to have increased motivation and would benefit from continuing support to help with processing past losses, emotional regulation, impulse control, relapse prevention, codependency, and increasing self-esteem.     P) Next session is scheduled for 11/19/20.   Marlen Lara, [facility]  11/17/2020    "

## 2020-11-18 ENCOUNTER — HOSPITAL ENCOUNTER (OUTPATIENT)
Dept: BEHAVIORAL HEALTH | Facility: CLINIC | Age: 30
End: 2020-11-18
Attending: FAMILY MEDICINE
Payer: COMMERCIAL

## 2020-11-18 VITALS — OXYGEN SATURATION: 99 % | TEMPERATURE: 97.5 F

## 2020-11-18 PROCEDURE — 1002N00001 HC LODGING PLUS FACILITY CHARGE ADULT

## 2020-11-18 PROCEDURE — H2035 A/D TX PROGRAM, PER HOUR: HCPCS | Mod: HQ

## 2020-11-18 NOTE — GROUP NOTE
Group Therapy Documentation    PATIENT'S NAME: Flower Logan  MRN:   [id number]  :   1990  ACCT. NUMBER: [id number]  DATE OF SERVICE: 20  START TIME:  8:30 AM  END TIME:  9:30 AM  FACILITATOR(S): Fran Otto LADC  TOPIC: BEH Group Therapy  Number of patients attending the group:  7  Group Length:  1 Hours    Group Therapy Type: Recovery strategies    Summary of Group / Topics Discussed:    Recovery Principles      Group Attendance:  Attended group session    Patient's response to the group topic/interactions:  cooperative with task    Patient appeared to be Attentive.        Client specific details:  Flower gave appropriate feedback..

## 2020-11-18 NOTE — GROUP NOTE
Group Therapy Documentation    PATIENT'S NAME: Flower Logan  MRN:   [id number]  :   1990  ACCT. NUMBER: [id number]  DATE OF SERVICE: 20  START TIME: 10:00 AM  END TIME: 11:30 AM  FACILITATOR(S): Scarlett Sapp LADC  TOPIC: BEH Group Therapy  Number of patients attending the group:  7  Group Length:  2 Hours    Group Therapy Type: Recovery strategies, Emotion processing, and Daily living/independence skills    Summary of Group / Topics Discussed:    Recovery Principles, Balanced lifestyle, Disease of addiction, Emotions/expression, and Relapse prevention      Group Attendance:  Attended group session    Patient's response to the group topic/interactions:  cooperative with task and discussed personal experience with topic    Patient appeared to be Actively participating.        Client specific details: Patient reports that she is feeling,   calm, excited, hopeful and committed for the future.   She presented her [place] treatment plan assignment, sharing values she violated and insane behaviors while she was drinking.  Patient received favorable feedback from her peers. Patient was an active participant within group. She appears engaged and willing to give appropriate feedback to her peers and is open to receive feedback from her peers as well.

## 2020-11-19 ENCOUNTER — HOSPITAL ENCOUNTER (OUTPATIENT)
Dept: BEHAVIORAL HEALTH | Facility: CLINIC | Age: 30
End: 2020-11-19
Attending: FAMILY MEDICINE
Payer: COMMERCIAL

## 2020-11-19 VITALS — TEMPERATURE: 98.9 F | OXYGEN SATURATION: 96 %

## 2020-11-19 PROCEDURE — 1002N00001 HC LODGING PLUS FACILITY CHARGE ADULT

## 2020-11-19 PROCEDURE — H2035 A/D TX PROGRAM, PER HOUR: HCPCS | Mod: HQ

## 2020-11-19 PROCEDURE — H2035 A/D TX PROGRAM, PER HOUR: HCPCS

## 2020-11-19 NOTE — GROUP NOTE
"Group Therapy Documentation    PATIENT'S NAME: Flower Logan  MRN:   [id number]  :   1990  ACCT. NUMBER: [id number]  DATE OF SERVICE: 20  START TIME:  9:00 AM  END TIME: 11:00 AM  FACILITATOR(S): Scarlett Sapp LADC  TOPIC: BEH Group Therapy  Number of patients attending the group:  8  Group Length:  2 Hours    Group Therapy Type: Recovery strategies and Emotion processing    Summary of Group / Topics Discussed:    Sober coping skills, Relationship/socialization, Trauma informed care, and Emotions/expression      Group Attendance:  Attended group session    Patient's response to the group topic/interactions:  cooperative with task, discussed personal experience with topic and listened actively    Patient appeared to be Actively participating.        Client specific details: Patient reports that she is feeling,   excited\" for things planned in the future with her recovery and \" calm  . She reports that she didn t have an assignment to present today, however is working on her treatment plan assignments. Patient was an active participant within group sharing personal experiences of shame after patients shared their assignments. She appears engaged and willing to give appropriate feedback to her peers and is open to receive feedback from her peers as well.   "

## 2020-11-19 NOTE — ADDENDUM NOTE
Encounter addended by: Ebony Sarmiento LADC on: 11/18/2020 8:04 PM   Actions taken: Cosign clinical note with attestation

## 2020-11-19 NOTE — PROGRESS NOTES
"INDIVIDUAL SESSION SUMMARY    D) Met with client on 11/19/20 from 1:30-2:30pm. Client is in the [facility]. Client reported that she connected with a female sponsor with whom she is talking to each evening. Client shared that she spoke with her parents and will not be going to [place] to see them for [place] because her S.O. is not invited. Client expressed some concerns about spending the month of December with S.O., alone, in her apartment. Client shared that S.O. has been \"good\" that last two days but that he can be unpredictable and he is looking at serving 4 months penitentiary time in 2021. Client spoke about her need to know her S.O. is \"ok\" before she can be ok, and shared that she spends much of her free time on the phone with S.O.. Client spoke about her struggles with the assignment to write a letter to her ex S.O. and the unprocessed grief she has regarding the end of their relaitonship. Client spoke of anxiety living alone because she has only lived with others. Client spoke of not knowing if she can \"trust\" herself with her prescribed medications. Client expressed feelings of \"boredom\" and \"restlessness\".     I) Individual session with client. Provided client with verbal interventions including: validation, nurturing, compassion and support. Discussed the option for sober living versus returning to her apartment, alone, or living with her S.O.. Spoke about the importance of being honest with her medical providers, primary counselors and family about her cravings and concerns. Encouraged client to be more curious this week about her relationship patterns and the correlation to her substance abuse.     A) Client appears to have insight into unhealthy relationship patterns and is challenged to prioritize her wants, feelings and needs. Client appears uncomfortable with being alone and not having someone else to worry about. Client appears to be struggling to form connections to sober peers. Client " appears guarded with her emotions. Client appears to understand the importance of creating a sober support network but is struggling to form sober relaitonships. Client appears to have moderate motivation and would benefit from continuing support to help with processing past losses, emotional regulation, impulse control, relapse prevention, codependency, understanding the cycle of abuse, and increasing self-esteem.     P) Next session is scheduled for 11/24/20.     Marlen Lara, [facility]  11/19/2020

## 2020-11-19 NOTE — GROUP NOTE
Group Therapy Documentation    PATIENT'S NAME: Flower Logan  MRN:   [id number]  :   1990  ACCT. NUMBER: [id number]  DATE OF SERVICE: 20  START TIME: 12:30 PM  END TIME:  2:30 PM  FACILITATOR(S): Ebony Sarmiento LADC  TOPIC: BEH Group Therapy  Number of patients attending the group:  7    Group Therapy Type: Emotion processing and meditation and deep breathing.     Summary of Group / Topics Discussed:    Relationship/socialization, Mindfulness/Relaxation, and Emotions/expression      Group Attendance:  Attended group session    Patient's response to the group topic/interactions:  cooperative with task, discussed personal experience with topic and listened actively    Patient appeared to be Attentive and Engaged.Patient seemed distant from her feelings.    Client specific details:  Patient participated in meditation and deep breathing exercise. Patient gave supportive feedback to peers. She shared she was working on a good-bye letter to her former partner; and the therapist was helping her, because it was very difficult.

## 2020-11-19 NOTE — GROUP NOTE
Group Therapy Documentation    PATIENT'S NAME: Flower Logan  MRN:   [id number]  :   1990  ACCT. NUMBER: [id number]  DATE OF SERVICE: 20  START TIME:  3:00 PM  END TIME:  4:00 PM  FACILITATOR(S): Elvin Cui LADC; Katie Florez LADC  TOPIC: BEH Group Therapy  Number of patients attending the group:  23  Group Length:  1 Hours    Group Therapy Type:  Skills group on Mental health and Addiction.     Summary of Group / Topics Discussed:    Co-occurring illnesses symptom management      Group Attendance:  Attended group session    Patient's response to the group topic/interactions:  cooperative with task    Patient appeared to be Attentive.        Client specific details: . Skills group on Co-Occuring Disorder.

## 2020-11-20 ENCOUNTER — HOSPITAL ENCOUNTER (OUTPATIENT)
Dept: BEHAVIORAL HEALTH | Facility: CLINIC | Age: 30
End: 2020-11-20
Attending: FAMILY MEDICINE
Payer: COMMERCIAL

## 2020-11-20 VITALS — OXYGEN SATURATION: 97 % | TEMPERATURE: 97.9 F

## 2020-11-20 PROCEDURE — H2035 A/D TX PROGRAM, PER HOUR: HCPCS | Mod: HQ

## 2020-11-20 PROCEDURE — 1002N00001 HC LODGING PLUS FACILITY CHARGE ADULT

## 2020-11-20 NOTE — PROGRESS NOTES
This writer received a call from Luiza at [facility] in regards to a referral made on 11/19/2020 for the patients aftercare upon competition of [facility] . Patient is scheduled to begin programming on 12/07/2020. Patient will attend programming Monday-Thursday 9-4 and Friday-9-12.

## 2020-11-20 NOTE — GROUP NOTE
Group Therapy Documentation    PATIENT'S NAME: Flower Logan  MRN:   [id number]  :   1990  ACCT. NUMBER: [id number]  DATE OF SERVICE: 20  START TIME:  9:00 AM  END TIME: 11:00 AM  FACILITATOR(S): Kaleb Ross LADC  TOPIC: BEH Group Therapy  Number of patients attending the group: 7  Group Length:  2 Hours    Group Therapy Type: Recovery strategies and Daily living/independence skills    Summary of Group / Topics Discussed:    Recovery Principles, Sober coping skills, and Disease of addiction      Group Attendance:  Attended group session    Patient's response to the group topic/interactions:  cooperative with task    Patient appeared to be Actively participating.        Client specific details:  Patient by way of introduction said she was feeling some anxiety today. Gave feedback to other group members.

## 2020-11-20 NOTE — GROUP NOTE
Group Therapy Documentation    PATIENT'S NAME: Flower Logan  MRN:   [id number]  :   1990  ACCT. NUMBER: [id number]  DATE OF SERVICE: 20  START TIME: 12:30 AM  END TIME:  2:30 PM  FACILITATOR(S): Kaleb Ross LADC  TOPIC: BEH Group Therapy  Number of patients attending the group: 6  Group Length:  2 Hours    Group Therapy Type: Daily living/independence skills    Summary of Group / Topics Discussed:    Recovery Principles and Sober coping skills      Group Attendance:  Attended group session    Patient's response to the group topic/interactions:  cooperative with task    Patient appeared to be Attentive.        Client specific details:  Patient attended film about life stressors and discussed how life's challenges can be daunting. She recognizes the need to develop a plan on how to meet challenges should they arise.

## 2020-11-21 ENCOUNTER — HOSPITAL ENCOUNTER (OUTPATIENT)
Dept: BEHAVIORAL HEALTH | Facility: CLINIC | Age: 30
End: 2020-11-21
Attending: FAMILY MEDICINE
Payer: COMMERCIAL

## 2020-11-21 VITALS — TEMPERATURE: 96.6 F | OXYGEN SATURATION: 96 %

## 2020-11-21 PROCEDURE — H2035 A/D TX PROGRAM, PER HOUR: HCPCS | Mod: HQ

## 2020-11-21 PROCEDURE — 1002N00001 HC LODGING PLUS FACILITY CHARGE ADULT

## 2020-11-21 NOTE — GROUP NOTE
Group Therapy Documentation    PATIENT'S NAME: Flower Logan  MRN:   [id number]  :   1990  ACCT. NUMBER: [id number]  DATE OF SERVICE: 20  START TIME:  9:00 AM  END TIME: 11:00 AM  FACILITATOR(S): Fran Otto LADC; Scarlett Sapp LADC  TOPIC: BEH Group Therapy  Number of patients attending the group:  8  Group Length:  2 Hours    Group Therapy Type: Recovery strategies    Summary of Group / Topics Discussed:    Relapse prevention      Group Attendance:  Attended group session    Patient's response to the group topic/interactions:  cooperative with task    Patient appeared to be Actively participating.        Client specific details:  Flower was an active participant within the group giving appropriate feedback.  .

## 2020-11-21 NOTE — GROUP NOTE
Group Therapy Documentation    PATIENT'S NAME: Flower Logan  MRN:   [id number]  :   1990  ACCT. NUMBER: [id number]  DATE OF SERVICE: 20  START TIME: 12:30 AM  END TIME:  2:30 PM  FACILITATOR(S): Fran Otto LADC  TOPIC: BEH Group Therapy  Number of patients attending the group:  8  Group Length:  2 Hours    Group Therapy Type: Recovery strategies and Health and wellbeing     Summary of Group / Topics Discussed:    Balanced lifestyle and Relapse prevention      Group Attendance:  Attended group session    Patient's response to the group topic/interactions:  cooperative with task    Patient appeared to be Attentive.        Client specific details:  Flower gave appropriate feedback..

## 2020-11-22 ENCOUNTER — HOSPITAL ENCOUNTER (OUTPATIENT)
Dept: BEHAVIORAL HEALTH | Facility: CLINIC | Age: 30
End: 2020-11-22
Attending: FAMILY MEDICINE
Payer: COMMERCIAL

## 2020-11-22 VITALS — OXYGEN SATURATION: 99 % | TEMPERATURE: 97 F

## 2020-11-22 PROCEDURE — H2035 A/D TX PROGRAM, PER HOUR: HCPCS | Mod: HQ

## 2020-11-22 PROCEDURE — 1002N00001 HC LODGING PLUS FACILITY CHARGE ADULT

## 2020-11-22 NOTE — GROUP NOTE
Group Therapy Documentation    PATIENT'S NAME: Flower Logan  MRN:   [id number]  :   1990  ACCT. NUMBER: [id number]  DATE OF SERVICE: 20  START TIME:  8:40 AM  END TIME: 10:30 AM  FACILITATOR(S): Fran Otto LADC; Scarlett Sapp LADC; Renata Rice RN  TOPIC: BEH Group Therapy  Number of patients attending the group: 7  Group Length:  2 Hours    Group Therapy Type: Health and wellbeing     Summary of Group / Topics Discussed:    Self-care activities      Group Attendance:  Attended group session    Patient's response to the group topic/interactions:  cooperative with task    Patient appeared to be Actively participating.        Client specific details:  Flower was an active participant and gave appropriate feedback.

## 2020-11-23 ENCOUNTER — HOSPITAL ENCOUNTER (OUTPATIENT)
Dept: BEHAVIORAL HEALTH | Facility: CLINIC | Age: 30
End: 2020-11-23
Attending: FAMILY MEDICINE
Payer: COMMERCIAL

## 2020-11-23 VITALS — TEMPERATURE: 98.2 F | OXYGEN SATURATION: 100 %

## 2020-11-23 PROCEDURE — H2035 A/D TX PROGRAM, PER HOUR: HCPCS | Mod: HQ

## 2020-11-23 PROCEDURE — 1002N00001 HC LODGING PLUS FACILITY CHARGE ADULT

## 2020-11-23 NOTE — PROGRESS NOTES
"Patient: Flower Logan            Adult CD Progress Note and Treatment Plan Review     Attendance  Please refer to OP BEH CD Adult Attendance Record Documentation Flowsheet    Support group attended this week: Yes    Reporting sobriety:  Yes    Treatment Plan     Treatment Plan Review completed on: 11/23/20  This review covers 11/16-11/23/20       Client preferred learning style: Visual  Hands on  Demonstration    Staff Members contributing: Ebony Reyes [facility]; Scarlett Sapp [facility]; Fe Connell [facility]; Brianne Shirley CD Intern                       Received Supervision: No    Client: Patient contributed to goals and plan.    Client received copy of plan/revised plan: Yes    Client agrees with plan/revised plan: Yes    Changes to Treatment Plan: None    New Goals added since last review: No additional goals added at this time    Goals worked on since last review: Patient reports having completed her \"Grief and Loss\" and \"Self-Esteem\" assignments.    Strategies effective: Yes    Strategies need these changes: No changes needed at this time    1) Care Coordination Activities: Patient is scheduled to begin programming at [facility] [employer] on 12/7/20.  2) Medical, Mental Health and other appointments the client attended: Patient attended a psychiatry appointment last week for medication management.  She also attended 2 individual therapy sessions with staff therapist Marlen Lara.  3) Medication issues: None reported  4) Physical and mental health problems: See dimensions 2 and 3 below.  5) Any changes in Vulnerable Adult Status?  No If yes, add to treatment plan and individual abuse prevention plan.  6) Review and evaluation of the individual abuse prevention plan: Current IAPP for this program is adequate for this client    ASAM Risk Rating:    Dimension 1, 0: Patient reports her last date of use as 11/2/20. Patient denies any withdrawal symptoms that would interfere with full participation in treatment " "programming at this time. Patient will continue to be monitored throughout treatment.     Dimension 2, 0: Patient denies any biomedical concerns that would interfere with full participation in treatment programming at this time. Patient reports that she is currently medication compliant. Patient has been screened for symptoms of COVID-19 daily with no concerns.  Patient will continue to be monitored throughout treatment. Patient attended a lecture presented by staff RN on \"STDs and pregnancy\" on 11/22/20.    Dimension 3, 2: Patient reports mental health diagnoses of anxiety, depression, and bipolar disorder. Patient's current CGI 5/5.  Patient met with staff therapist Marlen Lara for two individual therapy sessions last week. Patient reports she has noticed her mood has been \"dull\" this past week and has experienced stress due to relationship concerns.  Patient reports \"distraction\" as her primary coping skills she uses to manage stress and difficult emotions.  Patient denies any suicidal thoughts or ideations at this time. Patient will continue to be monitored throughout treatment.     Dimension 4, 1: Patient verbally reports being motivated for long-term recovery. Patient identifies \"myself\" as her primary motivation to stay sober and in treatment this week.  Patient's group attendance and participation have been positive.    Dimension 5, 4: Patient rated her cravings on a scale from 1(low) to 10(high) as a \"8\" this week.  She reports \"distraction\" as her primary coping skills to manage these cravings.  Patient attended spirituality group this past week with Estrella Benjamin.  She also participated in weekend Relapse Prevention workshop and completed all activities.    Dimension 6, 3:  Patient is scheduled to begin programming at [facility] on 12/7/20.  Patient will continue to work with  staff to develop an aftercare plan that is supportive of her recovery.  Patient is spending free time with female " peers and has been attending 3 12-step meetings via Zoom weekly while at .      Guide to Risk Ratings for Suicidality:   IDEATION: Active thoughts of suicide? INTENT: Intent to follow on suicide? PLAN: Plan to follow through on suicide? Level of Risk:   IF Yes Yes Yes Patient = High Emergent   IF Yes Yes No Patient = High Urgent/Non-Emergent   IF Yes No No Patient = Moderate Non-Urgent   IF No No   No Patient = Low Risk   The patient's ADDITIONAL RISK FACTORS and lack of PROTECTIVE FACTORS may increase their overall suicide risk ratings.     Patient's/client's current risk rating:  Very Low Risk    Family Involvement:   Patient reports having phone contact with loved ones.    Data:   offered feedback good insight client did participate    Intervention:   Aftercare planning  Behavior modification  Cognitive Behavioral Therapy  Counselor feedback  Education  Emotional management  Group feedback  Relapse prevention  Twelve Step facilitation  Mental health education    Assessment:   Stages of Change Model  Contemplation    Appears/Sounds:  Cooperative  Engaged    Plan:  Focus on recovery environment  Monitor emotional/physical health  Continue working through treatment plan goals.       CECIL Guzman

## 2020-11-24 ENCOUNTER — HOSPITAL ENCOUNTER (OUTPATIENT)
Dept: BEHAVIORAL HEALTH | Facility: CLINIC | Age: 30
End: 2020-11-24
Attending: FAMILY MEDICINE
Payer: COMMERCIAL

## 2020-11-24 VITALS — OXYGEN SATURATION: 100 % | TEMPERATURE: 97.7 F

## 2020-11-24 PROCEDURE — H2035 A/D TX PROGRAM, PER HOUR: HCPCS | Mod: HQ

## 2020-11-24 PROCEDURE — 1002N00001 HC LODGING PLUS FACILITY CHARGE ADULT

## 2020-11-24 NOTE — GROUP NOTE
Group Therapy Documentation    PATIENT'S NAME: Flower Logan  MRN:   [id number]  :   1990  ACCT. NUMBER: [id number]  DATE OF SERVICE: 20  START TIME:  3:00 PM  END TIME:  4:00 PM  FACILITATOR(S): Kayla Shea  TOPIC: BEH Group Therapy  Number of patients in group: 21  Group Length: 1 Hour    Group Therapy Type: Recovery strategies/Emotional processing    Summary of group/Topics discussed:  Disease of addiction    Group Attendance:  Attended group session    Patient's response to the group topic/interactions:  cooperative with task    Patient appeared to be Actively participating, Attentive and Engaged.        Client specific details:  Patient was attentive and participative during group lecture..

## 2020-11-24 NOTE — GROUP NOTE
Group Therapy Documentation    PATIENT'S NAME: Flower Logan  MRN:   [id number]  :   1990  ACCT. NUMBER: [id number]  DATE OF SERVICE: 20  START TIME:  9:00 AM  END TIME: 11:00 AM  FACILITATOR(S): Fe Connell LADC; Brianne Shirley CD Intern  TOPIC: BEH Group Therapy  Number of patients attending the group:  6  Group Length:  2 Hours    Group Therapy Type: Recovery strategies and Emotion processing    Summary of Group / Topics Discussed:    Sober coping skills, Emotions/expression, and Relapse prevention      Group Attendance:  Attended group session    Patient's response to the group topic/interactions:  cooperative with task and gave appropriate feedback to peers    Patient appeared to be Actively participating, Attentive and Engaged.        Client specific details: During check-in pt reported feeling rested and clear-headed. She shared that the main thing she has not been able to conquer is alcohol. Pt gave appropriate feedback to her peers and participated in a discussion of consequences and how they relate to relapse prevention.

## 2020-11-24 NOTE — GROUP NOTE
Group Therapy Documentation    PATIENT'S NAME: Flower Logan  MRN:   [id number]  :   1990  ACCT. NUMBER: [id number]  DATE OF SERVICE: 20  START TIME: 12:30 PM  END TIME:  2:30 PM  FACILITATOR(S): Estrella Benjamin; Brianne Shirley CD Intern  TOPIC: BEH Group Therapy  Number of patients attending the group:  6  Group Length:  2 Hours    Group Therapy Type: Health and wellbeing     Summary of Group / Topics Discussed:    Spiritual Care    Group Attendance:  Attended group session    Patient's response to the group topic/interactions:  cooperative with task and listened actively    Patient appeared to be Attentive and Engaged.        Client specific details: Pt participated in a discussion about positive memories, mindfulness, and the mind-body connection led by Estrella Benjamin from [facility].

## 2020-11-25 ENCOUNTER — HOSPITAL ENCOUNTER (OUTPATIENT)
Dept: BEHAVIORAL HEALTH | Facility: CLINIC | Age: 30
End: 2020-11-25
Attending: FAMILY MEDICINE
Payer: COMMERCIAL

## 2020-11-25 VITALS — TEMPERATURE: 97.9 F | OXYGEN SATURATION: 97 %

## 2020-11-25 PROCEDURE — H2035 A/D TX PROGRAM, PER HOUR: HCPCS

## 2020-11-25 PROCEDURE — H2035 A/D TX PROGRAM, PER HOUR: HCPCS | Mod: HQ

## 2020-11-25 PROCEDURE — 1002N00001 HC LODGING PLUS FACILITY CHARGE ADULT

## 2020-11-25 NOTE — GROUP NOTE
Group Therapy Documentation    PATIENT'S NAME: Flower Logan  MRN:   [id number]  :   1990  ACCT. NUMBER: [id number]  DATE OF SERVICE: 20  START TIME: 10:00 AM  END TIME: 11:30 AM  FACILITATOR(S): Scarlett Sapp LADC  TOPIC: BEH Group Therapy  Number of patients attending the group:  7  Group Length:  2 Hours    Group Therapy Type: Recovery strategies and Emotion processing    Summary of Group / Topics Discussed:    Recovery Principles, Sober coping skills, and Emotions/expression      Group Attendance:  Attended group session    Patient's response to the group topic/interactions:  cooperative with task and discussed personal experience with topic    Patient appeared to be Actively participating.        Client specific details: Patient reports that she is feeling,  annoyed due to a headache, tense and worried  . She presented her 30 day self esteem builder treatment plan assignment and received favorable feedback from her peers. Patient was an active participant within group. She appears engaged and willing to give appropriate feedback to her peers and is open to receive feedback from her peers as well.

## 2020-11-25 NOTE — GROUP NOTE
"Group Therapy Documentation    PATIENT'S NAME: Flower Logan  MRN:   [id number]  :   1990  ACCT. NUMBER: [id number]  DATE OF SERVICE: 20  START TIME: 12:30 PM  END TIME:  2:30 PM  FACILITATOR(S): Dimple Rivas LADC  TOPIC: BEH Group Therapy  Number of patients attending the group:   Group Length:  2 Hours    Group Therapy Type: Recovery strategies    Summary of Group / Topics Discussed:    Sober coping skills, Disease of addiction, and Relapse prevention      Group Attendance:  Attended group session    Patient's response to the group topic/interactions:  cooperative with task    Patient appeared to be Actively participating and Engaged.        Client specific details:  Flower participated in reading and discussing topics in the book \"Sober living\".  "

## 2020-11-25 NOTE — GROUP NOTE
Group Therapy Documentation    PATIENT'S NAME: Flower Logan  MRN:   [id number]  :   1990  ACCT. NUMBER: [id number]  DATE OF SERVICE: 20  START TIME:  8:30 AM  END TIME:  9:30 AM  FACILITATOR(S): Wes Herrera LADC; Kyrie De Los Santos LADC; Noe Hurtado LADC  TOPIC: BEH Group Therapy  Number of patients attending the group:  20  Group Length:  2 Hours    Group Therapy Type: Daily living/independence skills    Summary of Group / Topics Discussed:    Recovery Principles, Sober coping skills, and Relapse prevention      Group Attendance:  Attended group session    Patient's response to the group topic/interactions:  cooperative with task    Patient appeared to be Actively participating.        Client specific details:  Pt was receptive to the topic and participated actively.

## 2020-11-25 NOTE — PROGRESS NOTES
"INDIVIDUAL SESSION SUMMARY    D) Met with client on 11/25/20 from 3:00-4:00pm. [facility] is in [facility]. Client shared that she'd caught her S.O. lying to her in the past week and that she's struggling with not being able to \"control\" his behavior while she is in this program. Client shared concerns about living together and how his unpredictable behaviors will affect her mental health and sobriety. Client expressed insight into her magical thinking and fear of being alone. Client discussed never having a \"healthy\" relationship but knowing that she deserves love, kindness and honesty from a S.O.. Client shared that she'd been avoiding the conversation with her mother about sober living. Client agreed to take information about a high intensity outpatient program with sober living.     I) Individual session with client. Provided client with verbal interventions including: validation, nurturing, compassion and support. Spoke about using magical thinking and chaos as a way of avoiding fear of being alone. Encouraged client to honestly look at the challenges she will face with S.O. when she returns home. Encouraged client to be curious about her future, sober, and free from an unhealthy relationship.    A) Client appears to have insight into unhealthy relationship patterns and struggling to prioritize her wants, feelings and needs over fear of the unknown. Client appears uncomfortable with being alone and not having someone else to worry about. Client appears to be struggling to form connections to sober peers and has been using her S.O. for the majority of her emotional support. Client appears guarded with her emotions. Client appears to understand the importance of creating a sober support network but is struggling to form sober relaitonships. Client appears to have moderate motivation and would benefit from continuing support to help with processing past losses, emotional regulation, impulse control, " relapse prevention, codependency, understanding the cycle of abuse, and increasing self-esteem.     P) Next session is scheduled for 11/30/20.     Marlen Lara, CHAGO  11/25/2020

## 2020-11-26 ENCOUNTER — HOSPITAL ENCOUNTER (OUTPATIENT)
Dept: BEHAVIORAL HEALTH | Facility: CLINIC | Age: 30
End: 2020-11-26
Attending: FAMILY MEDICINE
Payer: COMMERCIAL

## 2020-11-26 VITALS — TEMPERATURE: 97.8 F | OXYGEN SATURATION: 97 %

## 2020-11-26 PROCEDURE — 1002N00001 HC LODGING PLUS FACILITY CHARGE ADULT

## 2020-11-26 PROCEDURE — H2035 A/D TX PROGRAM, PER HOUR: HCPCS | Mod: HQ

## 2020-11-26 NOTE — GROUP NOTE
"Group Therapy Documentation    PATIENT'S NAME: Flower Logan  MRN:   [id number]  :   1990  ACCT. NUMBER: [id number]  DATE OF SERVICE: 20  START TIME:  9:00 AM  END TIME: 11:00 AM  FACILITATOR(S): Emmett Hunt  TOPIC: BEH Group Therapy  Number of patients attending the group: 8  Group Length:  2 Hours    Group Therapy Type: Recovery strategies and Emotion processing    Group was facilitated by CECIL Trevino    Summary of Group / Topics Discussed:    Recovery Principles, Sober coping skills, and Relationship/socialization    [facility] [facility] Resource Information, followed by movie \"Anonymous People.\"      Group Attendance:  Attended group session    Patient's response to the group topic/interactions:  cooperative with task and expressed understanding of topic    Patient appeared to be Actively participating, Attentive and Engaged.        Client specific details: Patient participated and was active in group.  "

## 2020-11-26 NOTE — GROUP NOTE
"Group Therapy Documentation    PATIENT'S NAME: Flower Logan  MRN:   [id number]  :   1990  ACCT. NUMBER: [id number]  DATE OF SERVICE: 20  START TIME:  9:00 AM  END TIME: 11:00 AM  FACILITATOR(S): Emmett Hunt  TOPIC: BEH Group Therapy  Number of patients attending the group: 8  Group Length:  2 Hours    Group Therapy Type: Recovery strategies and Emotion processing    Group was facilitated by CECIL Trevino    Summary of Group / Topics Discussed:    Recovery Principles, Sober coping skills, and Relationship/socialization    [facility] [facility] Resource Information, followed by movie \"Anonymous People.\"      Group Attendance:  Attended group session    Patient's response to the group topic/interactions:  cooperative with task and expressed understanding of topic    Patient appeared to be Actively participating, Attentive and Engaged.        Client specific details: Patient participated and was active in group.  "

## 2020-11-27 ENCOUNTER — HOSPITAL ENCOUNTER (OUTPATIENT)
Dept: BEHAVIORAL HEALTH | Facility: CLINIC | Age: 30
End: 2020-11-27
Attending: FAMILY MEDICINE
Payer: COMMERCIAL

## 2020-11-27 VITALS — TEMPERATURE: 98.5 F | OXYGEN SATURATION: 96 %

## 2020-11-27 PROCEDURE — 1002N00001 HC LODGING PLUS FACILITY CHARGE ADULT

## 2020-11-27 PROCEDURE — H2035 A/D TX PROGRAM, PER HOUR: HCPCS | Mod: HQ

## 2020-11-27 NOTE — GROUP NOTE
Group Therapy Documentation    PATIENT'S NAME: Flower Logan  MRN:   [id number]  :   1990  ACCT. NUMBER: [id number]  DATE OF SERVICE: 20  START TIME: 12:30 PM  END TIME:  2:30 PM  FACILITATOR(S): Dimple Rivas LADC  TOPIC: BEH Group Therapy  Number of patients attending the group: 1  Group Length:  2 Hours    Group Therapy Type: Daily living/independence skills    Summary of Group / Topics Discussed:    Sober coping skills and Relationship/socialization      Group Attendance:  Attended group session    Patient's response to the group topic/interactions:  cooperative with task    Patient appeared to be Actively participating and Engaged.        Client specific details:  Flower was appropriate in group, she participated in watching a movie with peers. She also gave good feedback to a peer who shared her assignment in group.

## 2020-11-27 NOTE — GROUP NOTE
Group Therapy Documentation    PATIENT'S NAME: Flower Logan  MRN:   [id number]  :   1990  ACCT. NUMBER: [id number]  DATE OF SERVICE: 20  START TIME:  9:00 AM  END TIME: 11:00 AM  FACILITATOR(S): Jamie Milner LADC  TOPIC: BEH Group Therapy  Number of patients attending the group:  7  Group Length:  2 Hours    Group Therapy Type: Recovery strategies, Emotion processing, and Daily living/independence skills    Summary of Group / Topics Discussed:    Sober coping skills, Relationship/socialization, Cognitive behavioral therapy skills, Emotions/expression, and Self-care activities      Group Attendance:  Attended group session    Patient's response to the group topic/interactions:  cooperative with task, discussed personal experience with topic and expressed readiness to alter behaviors    Patient appeared to be Actively participating.        Client specific details:  Patient discussed her aftercare plan. She has found a sponsor and has begun working on the 12 steps..   English

## 2020-11-28 ENCOUNTER — HOSPITAL ENCOUNTER (OUTPATIENT)
Dept: BEHAVIORAL HEALTH | Facility: CLINIC | Age: 30
End: 2020-11-28
Attending: FAMILY MEDICINE
Payer: COMMERCIAL

## 2020-11-28 VITALS — OXYGEN SATURATION: 99 % | TEMPERATURE: 98.5 F

## 2020-11-28 PROCEDURE — 1002N00001 HC LODGING PLUS FACILITY CHARGE ADULT

## 2020-11-28 PROCEDURE — H2035 A/D TX PROGRAM, PER HOUR: HCPCS | Mod: HQ

## 2020-11-28 NOTE — PROGRESS NOTES
Patient met with program  to review and initiate aftercare placement opportunities. Patient reported that she intends to move back to her apartment after completing LP. Patient acknowledges a need to enter a program offering outpatient services however, she is reluctant to engage sober housing options. Patient was provided information about various programs both with and without sober living opportunities. Patient stated that she has made arrangements to transition into the [facility] outpatient program. Additional information has been provided to the patient as an alternative to her current plan if needed.

## 2020-11-28 NOTE — GROUP NOTE
Group Therapy Documentation    PATIENT'S NAME: Flower Logan  MRN:   [id number]  :   1990  ACCT. NUMBER: [id number]  DATE OF SERVICE: 20  START TIME: 12:30 PM  END TIME:  2:30 PM  FACILITATOR(S): Fe Connell LADC; Noe Hurtado LADC  TOPIC: BEH Group Therapy  Number of patients attending the group: 20  Group Length:  2 Hours    Group Therapy Type: Recovery strategies    Summary of Group / Topics Discussed:    Relationship/socialization      Group Attendance:  Attended group session    Patient's response to the group topic/interactions:  cooperative with task    Patient appeared to be Attentive and Engaged.        Client specific details: Flower was an active participant in afternoon session of Relationships Workshop.

## 2020-11-28 NOTE — GROUP NOTE
Group Therapy Documentation    PATIENT'S NAME: Flower Logan  MRN:   [id number]  :   1990  ACCT. NUMBER: [id number]  DATE OF SERVICE: 20  START TIME:  9:00 AM  END TIME: 11:00 AM  FACILITATOR(S): Fe Connell LADC; Jamie Milner LADC  TOPIC: BEH Group Therapy  Number of patients attending the group: 22  Group Length:  2 Hours    Group Therapy Type: Recovery strategies    Summary of Group / Topics Discussed:    Relationship/socialization      Group Attendance:  Attended group session    Patient's response to the group topic/interactions:  cooperative with task    Patient appeared to be Attentive and Engaged.        Client specific details: Flower was an active participant in morning session of Relationships Workshop.

## 2020-11-29 ENCOUNTER — HOSPITAL ENCOUNTER (OUTPATIENT)
Dept: BEHAVIORAL HEALTH | Facility: CLINIC | Age: 30
End: 2020-11-29
Attending: FAMILY MEDICINE
Payer: COMMERCIAL

## 2020-11-29 VITALS — OXYGEN SATURATION: 95 % | TEMPERATURE: 98.9 F

## 2020-11-29 PROCEDURE — H2035 A/D TX PROGRAM, PER HOUR: HCPCS | Mod: HQ

## 2020-11-29 PROCEDURE — 1002N00001 HC LODGING PLUS FACILITY CHARGE ADULT

## 2020-11-29 NOTE — GROUP NOTE
Group Therapy Documentation    PATIENT'S NAME: Flower Logan  MRN:   [id number]  :   1990  ACCT. NUMBER: [id number]  DATE OF SERVICE: 20  START TIME:  8:45 AM  END TIME: 10:30 AM  FACILITATOR(S): Fe Connell LADC; Luiza Mann RN; Jamie Milner LADC  TOPIC: BEH Group Therapy  Number of patients attending the group: 21  Group Length:  2 Hours    Group Therapy Type: Health and wellbeing     Summary of Group / Topics Discussed:    Co-occurring illnesses symptom management and Self-care activities      Group Attendance:  Attended group session    Patient's response to the group topic/interactions:  cooperative with task    Patient appeared to be Attentive and Engaged.        Client specific details: Flower was an active participant in nursing lecture on HIV/AIDS.

## 2020-11-29 NOTE — GROUP NOTE
Group Therapy Documentation    PATIENT'S NAME: Flower Logan  MRN:   [id number]  :   1990  ACCT. NUMBER: [id number]  DATE OF SERVICE: 20  START TIME: 12:30 PM  END TIME:  1:30 PM  FACILITATOR(S): Fe Connell LADC  TOPIC: BEH Group Therapy  Number of patients attending the group: 22  Group Length:  1 Hours    Group Therapy Type: Recovery strategies    Summary of Group / Topics Discussed:    Relationship/socialization      Group Attendance:  Attended group session    Patient's response to the group topic/interactions:  cooperative with task    Patient appeared to be Actively participating, Attentive and Engaged.        Client specific details: Flower was an active participant in afternoon Skills Group on honesty in recovery.

## 2020-11-30 ENCOUNTER — HOSPITAL ENCOUNTER (OUTPATIENT)
Dept: BEHAVIORAL HEALTH | Facility: CLINIC | Age: 30
End: 2020-11-30
Attending: FAMILY MEDICINE
Payer: COMMERCIAL

## 2020-11-30 VITALS — OXYGEN SATURATION: 97 % | TEMPERATURE: 97.8 F

## 2020-11-30 PROCEDURE — H2035 A/D TX PROGRAM, PER HOUR: HCPCS | Mod: HQ

## 2020-11-30 PROCEDURE — H2035 A/D TX PROGRAM, PER HOUR: HCPCS

## 2020-11-30 PROCEDURE — 1002N00001 HC LODGING PLUS FACILITY CHARGE ADULT

## 2020-11-30 NOTE — PROGRESS NOTES
Patient: Flower Logan            Adult CD Progress Note and Treatment Plan Review     Attendance  Please refer to OP BEH CD Adult Attendance Record Documentation Flowsheet    Support group attended this week: Yes    Reporting sobriety:  Yes    Treatment Plan     Treatment Plan Review completed on: 11/30/20  This review covers 11/23-11/30/20    Client preferred learning style: Visual  Hands on  Demonstration    Staff Members contributing: Ebony Sarmiento [facility]; Scarlett Sapp [facility]: Fe Connell [facility]; Brianne Shirley CD Intern                       Received Supervision: No    Client: Patient contributed to goals and plan.    Client received copy of plan/revised plan: Yes    Client agrees with plan/revised plan: Yes    Changes to Treatment Plan: None    New Goals added since last review: No additional goals added at this time    Goals worked on since last review: Patient has completed nearly all treatment plan assignments at this point, and is scheduled to discharge on Friday, 12/4/20.    Strategies effective: Yes    Strategies need these changes: No changes needed at this time    1) Care Coordination Activities: Patient is scheduled to begin outpatient programming at [facility] on 12/7/20.  2) Medical, Mental Health and other appointments the client attended: Patient attended an individual therapy session with staff therapist Marlen Lara.  3) Medication issues: None reported.  4) Physical and mental health problems: See dimensions 2 and 3 below.  5) Any changes in Vulnerable Adult Status?  No If yes, add to treatment plan and individual abuse prevention plan.  6) Review and evaluation of the individual abuse prevention plan: Current IAPP for this program is adequate for this client    ASAM Risk Rating:    Dimension 1, 0: Patient reports her last date of use as 11/2/20. Patient denies any withdrawal symptoms that would interfere with full participation in treatment programming at this time. Patient  "will continue to be monitored throughout treatment.     Dimension 2, 0: Patient denies any biomedical concerns that would interfere with full participation in treatment programming at this time. Patient reports that she is currently medication compliant. Patient has been screened for symptoms of COVID-19 daily with no concerns.  Patient will continue to be monitored throughout treatment. Patient attended a lecture presented by staff RN on \"HIV/AIDS\" on 11/29/20.    Dimension 3, 2: Patient reports mental health diagnoses of anxiety, depression, and bipolar disorder. Patient's current CGI 4/4.  Patient met with staff therapist Marlen Lara for an individual therapy session. Patient reports \"distractions\" as her primary coping skills she uses to manage stress and difficult emotions.  Patient denies any suicidal thoughts or ideations at this time. Patient will continue to be monitored throughout treatment.     Dimension 4, 1: Patient verbally reports being motivated for long-term recovery. Patient identifies \"hope for the future\" as her primary motivation to stay sober and in treatment this week.  Patient's group attendance and participation have been positive.    Dimension 5, 4: Patient rated her cravings on a scale from 1(low) to 10(high) as a \"7\" this week.  She reports \"distraction\" as her primary coping skills to manage these cravings.  Patient attended spirituality group this past week with Estrella Benjamin.      Dimension 6, 3:  Patient's current aftercare plan includes outpatient programming at [facility] and returning to her apartment.  Patient is spending free time with female peers and has been attending 3 12-step meetings via Zoom weekly while at .      Guide to Risk Ratings for Suicidality:   IDEATION: Active thoughts of suicide? INTENT: Intent to follow on suicide? PLAN: Plan to follow through on suicide? Level of Risk:   IF Yes Yes Yes Patient = High Emergent   IF Yes Yes No Patient = High " Urgent/Non-Emergent   IF Yes No No Patient = Moderate Non-Urgent   IF No No   No Patient = Low Risk   The patient's ADDITIONAL RISK FACTORS and lack of PROTECTIVE FACTORS may increase their overall suicide risk ratings.     Patient's/client's current risk rating:  Very Low Risk    Family Involvement:   Patient reports having phone contact with loved ones.    Data:   offered feedback good insight client did participate    Intervention:   Cognitive Behavioral Therapy  Counselor feedback  Education  Emotional management  Group feedback  Relapse prevention  Twelve Step facilitation  Mental health education    Assessment:   Stages of Change Model  Contemplation    Appears/Sounds:  Cooperative  Engaged    Plan:  Focus on recovery environment  Monitor emotional/physical health       CECIL Guzman

## 2020-11-30 NOTE — PROGRESS NOTES
Nursing Discharge Planning Meeting    Writer completed discharge planning meeting with patient. Discharge is planned for 12/4(/20    Discussed appropriate follow up care to manage ILDA, [place] amd medical and to obtain medication refills. Patient given a copy of their current medications for reference. Questions were answered at this time and the patient verbalized an understanding of the post-discharge follow up plan.    [facility] a [facility] provider as this provider is on leave   Psychiatrist/therapist:   Norris Taylor   [facility] psychiat[facility] [address] [place] [address], [address], [place] [address]   [phone]  Continue to support patient in discharge planning as needed to assure appropriate continuity of care.     Tobacco Cessation  Patient participated in the nicotine replacement therapy for tobacco cessation or reduction during their treatment programming: Yes, pt used NRT products to decrease tobacco use    The patient was provided with community resources for follow-up to continue tobacco cessation support once in the community. Also the patient was encoruaged to discuss their tobacco cessation efforts with the primary care provider.

## 2020-11-30 NOTE — GROUP NOTE
Group Therapy Documentation    PATIENT'S NAME: Flower Logan  MRN:   [id number]  :   1990  ACCT. NUMBER: [id number]  DATE OF SERVICE: 20  START TIME:  9:00 AM  END TIME: 11:00 AM  FACILITATOR(S): Fe Connell LADC  TOPIC: BEH Group Therapy  Number of patients attending the group: 6  Group Length:  2 Hours    Group Therapy Type: Emotion processing    Summary of Group / Topics Discussed:    Sober coping skills, Trauma informed care, Emotions/expression, and Relapse prevention      Group Attendance:  Attended group session    Patient's response to the group topic/interactions:  cooperative with task    Patient appeared to be Actively participating, Attentive and Engaged.        Client specific details: Flower was an active participant in morning group therapy session. She contributed to group discussion and gave appropriate feedback to her peer who presented a treatment plan assignment.

## 2020-11-30 NOTE — GROUP NOTE
Group Therapy Documentation    PATIENT'S NAME: Flower Logan  MRN:   [id number]  :   1990  ACCT. NUMBER: [id number]  DATE OF SERVICE: 20  START TIME: 12:30 PM  END TIME:  2:30 PM  FACILITATOR(S): Scarlett Sapp LADC  TOPIC: BEH Group Therapy  Number of patients attending the group:  7  Group Length:  2 Hours    Group Therapy Type: Recovery strategies and Emotion processing    Summary of Group / Topics Discussed:    Trauma informed care      Group Attendance:  Attended group session    Patient's response to the group topic/interactions:  cooperative with task    Patient appeared to be Actively participating.        Client specific details:  Flower was an active participant within the group and appears open to share her personal experiences.

## 2020-11-30 NOTE — PROGRESS NOTES
"INDIVIDUAL SESSION SUMMARY    D) Met with client on 11/30/20 from 12:30-1:30pm. Client is in [facility]. Client shared feeling frustrated with her mom and sister, stating that they are \"building up a wall\" with the boundaries they have set. Client shared that she feels \"stuck\" and \"doesn't know what to do\". Client stated \"I want to be independent\". Client discussed her immediate plans including [facility] for IOP and finding PT work. Client expressed an interest in working in the field of addiction in the future. Client stated that she has not made up her mind yet about sober living and plans to make some calls today to request more information. Client shared that she would like to know about sober living as a back-up plan. Client stated she's be calling [facility] for a sponsor today.     I) Individual session with client. Discussed healthy boundaries and accepting that her family members are allowed to set their own boundaries with her even when she doesn't like it. Encouraged client to consider how she will follow through on boundaries and expectations with S.O. if they resume living together. Gave information on [facility]  program.     A) Client appears to know what she needs for sober support and recovery but is struggling to take action. Client appears to have mixed motivation and would benefit from continuing support to help with processing past losses, emotional regulation, impulse control, relapse prevention, codependency, understanding the cycle of abuse, and increasing self-esteem.     P) Next session is scheduled for 12/3/20.      Marlen Lara, CHAGO  11/30/2020    "

## 2020-12-01 ENCOUNTER — HOSPITAL ENCOUNTER (OUTPATIENT)
Dept: BEHAVIORAL HEALTH | Facility: CLINIC | Age: 30
End: 2020-12-01
Attending: FAMILY MEDICINE
Payer: COMMERCIAL

## 2020-12-01 VITALS — TEMPERATURE: 96.1 F | OXYGEN SATURATION: 98 %

## 2020-12-01 PROCEDURE — H2035 A/D TX PROGRAM, PER HOUR: HCPCS | Mod: HQ

## 2020-12-01 PROCEDURE — 1002N00001 HC LODGING PLUS FACILITY CHARGE ADULT

## 2020-12-01 PROCEDURE — H2035 A/D TX PROGRAM, PER HOUR: HCPCS

## 2020-12-01 NOTE — GROUP NOTE
Group Therapy Documentation    PATIENT'S NAME: Flower Logan  MRN:   [id number]  :   1990  ACCT. NUMBER: [id number]  DATE OF SERVICE: 20  START TIME:  9:00 AM  END TIME: 11:00 AM  FACILITATOR(S): Scarlett Sapp LADC; Brianne Shirley CD Intern  TOPIC: BEH Group Therapy  Number of patients attending the group:  7  Group Length:  2 Hours    Group Therapy Type: Recovery strategies and Emotion processing    Summary of Group / Topics Discussed:    Disease of addiction, Emotions/expression, and Self-care activities      Group Attendance:  Attended group session    Patient's response to the group topic/interactions:  cooperative with task, discussed personal experience with topic and gave appropriate feedback to peers    Patient appeared to be Actively participating, Attentive and Engaged.        Client specific details: During check-in, pt reported feeling excited and motivated, and shared that if she could be an animal, she would choose to be a reynaga, because they live in packs. She offered appropriate feedback to her peers.

## 2020-12-01 NOTE — GROUP NOTE
Group Therapy Documentation    PATIENT'S NAME: Flower Logan  MRN:   [id number]  :   1990  ACCT. NUMBER: [id number]  DATE OF SERVICE: 20  START TIME:  3:00 PM  END TIME:  4:00 PM  FACILITATOR(S): Dimple Rivas LADC; Kayla Shea  TOPIC: BEH Group Therapy  Number of patients attending the group: 21  Group Length:  1 Hours    Group Therapy Type: Emotion processing    Summary of Group / Topics Discussed:    Sober coping skills and Emotions/expression      Group Attendance:  Attended group session    Patient's response to the group topic/interactions:  cooperative with task    Patient appeared to be Attentive.        Client specific details:  Flower was appropriate in skills group.

## 2020-12-01 NOTE — GROUP NOTE
Group Therapy Documentation    PATIENT'S NAME: Flower Logan  MRN:   [id number]  :   1990  ACCT. NUMBER: [id number]  DATE OF SERVICE: 20  START TIME: 12:30 PM  END TIME:  2:30 PM  FACILITATOR(S): Dimple Rivas LADC  TOPIC: BEH Group Therapy  Number of patients attending the group:  8  Group Length:  2 Hours    Group Therapy Type: Emotion processing    Summary of Group / Topics Discussed:    Spiritual Care      Group Attendance:  Attended group session    Patient's response to the group topic/interactions:  cooperative with task    Patient appeared to be Actively participating and Engaged.        Client specific details:  Flower was engaged and appropriate in spirituality group  facilitated by Estrella Benjamin

## 2020-12-02 ENCOUNTER — HOSPITAL ENCOUNTER (OUTPATIENT)
Dept: BEHAVIORAL HEALTH | Facility: CLINIC | Age: 30
End: 2020-12-02
Attending: FAMILY MEDICINE
Payer: COMMERCIAL

## 2020-12-02 VITALS — OXYGEN SATURATION: 98 % | TEMPERATURE: 96.8 F

## 2020-12-02 PROCEDURE — H2035 A/D TX PROGRAM, PER HOUR: HCPCS | Mod: HQ

## 2020-12-02 PROCEDURE — 1002N00001 HC LODGING PLUS FACILITY CHARGE ADULT

## 2020-12-02 NOTE — GROUP NOTE
Group Therapy Documentation    PATIENT'S NAME: Flower Logan  MRN:   [id number]  :   1990  ACCT. NUMBER: [id number]  DATE OF SERVICE: 20  START TIME: 10:00 AM  END TIME: 11:30 AM  FACILITATOR(S): Scarlett Sapp LADC; Brianne Shirley CD Intern  TOPIC: BEH Group Therapy  Number of patients attending the group:  7  Group Length:  1.5 Hours    Group Therapy Type: Recovery strategies and Emotion processing    Summary of Group / Topics Discussed:    Relationship/socialization, Disease of addiction, and Emotions/expression      Group Attendance:  Attended group session    Patient's response to the group topic/interactions:  expressed understanding of topic, gave appropriate feedback to peers and listened actively    Patient appeared to be Attentive and Engaged.        Client specific details: During check-in, pt reported feeling exhausted and anxious, and shared that she believes it's possible to be valuable as a person without being considered successful. She offered appropriate feedback to her peers.

## 2020-12-02 NOTE — GROUP NOTE
Group Therapy Documentation    PATIENT'S NAME: Flower Logan  MRN:   [id number]  :   1990  ACCT. NUMBER: [id number]  DATE OF SERVICE: 20  START TIME:  8:30 AM  END TIME:  9:30 AM  FACILITATOR(S): Wes Herrera LADC  TOPIC: BEH Group Therapy  Number of patients attending the group:  8  Group Length:  1 Hours    Group Therapy Type: Health and wellbeing  and Medication education    Summary of Group / Topics Discussed:    Co-occurring illnesses symptom management, Disease of addiction, Medication management, and Relapse prevention      Group Attendance:  Attended group session    Patient's response to the group topic/interactions:  cooperative with task    Patient appeared to be Actively participating.        Client specific details:  Pt was receptive to the topic and participated actively.

## 2020-12-02 NOTE — GROUP NOTE
Group Therapy Documentation    PATIENT'S NAME: Flower Logan  MRN:   [id number]  :   1990  ACCT. NUMBER: [id number]  DATE OF SERVICE: 20  START TIME: 12:30 PM  END TIME:  2:30 PM  FACILITATOR(S): Dimple Rivas LADC  TOPIC: BEH Group Therapy  Number of patients attending the group: 8  Group Length:  2 Hours    Group Therapy Type: Recovery strategies and Emotion processing    Summary of Group / Topics Discussed:    Disease of addiction and Emotions/expression      Group Attendance:  Attended group session    Patient's response to the group topic/interactions:  cooperative with task    Patient appeared to be Actively participating and Engaged.        Client specific details: Flower participated in group discussion, completed a worksheet and shared in group this pm.

## 2020-12-03 ENCOUNTER — HOSPITAL ENCOUNTER (OUTPATIENT)
Dept: BEHAVIORAL HEALTH | Facility: CLINIC | Age: 30
End: 2020-12-03
Attending: FAMILY MEDICINE
Payer: COMMERCIAL

## 2020-12-03 VITALS — OXYGEN SATURATION: 98 % | TEMPERATURE: 97.6 F

## 2020-12-03 PROCEDURE — H2035 A/D TX PROGRAM, PER HOUR: HCPCS | Mod: HQ

## 2020-12-03 PROCEDURE — 1002N00001 HC LODGING PLUS FACILITY CHARGE ADULT

## 2020-12-03 NOTE — GROUP NOTE
Group Therapy Documentation    PATIENT'S NAME: Flower Logan  MRN:   [id number]  :   1990  ACCT. NUMBER: [id number]  DATE OF SERVICE: 20  START TIME:  9:00 AM  END TIME: 11:00 AM  FACILITATOR(S): Scarlett Sapp LADC; Brianne Shirley CD Intern  TOPIC: BEH Group Therapy  Number of patients attending the group:  8  Group Length:  2 Hours    Group Therapy Type: Recovery strategies and Emotion processing    Summary of Group / Topics Discussed:    Sober coping skills, Relationship/socialization, and Relapse prevention      Group Attendance:  Attended group session    Patient's response to the group topic/interactions:  cooperative with task, expressed readiness to alter behaviors and expressed understanding of topic    Patient appeared to be Actively participating, Attentive and Engaged.        Client specific details: During check-in, pt reported feeling uncertain but excited, and shared that  Leaving her ex was the bravest thing she's ever done. She presented assignments on letting go of control and relapse prevention and was open to feedback. Pt indicated that attempting to control situations and people brought stress, disappointment, frustration, anger, and depression into her life, which exacerbated her drinking.

## 2020-12-03 NOTE — GROUP NOTE
"Group Therapy Documentation    PATIENT'S NAME: Flower Logan  MRN:   [id number]  :   1990  ACCT. NUMBER: [id number]  DATE OF SERVICE: 20  START TIME: 12:30 PM  END TIME:  2:30 PM  FACILITATOR(S): Fe Connell LADC  TOPIC: BEH Group Therapy  Number of patients attending the group: 7  Group Length:  2 Hours    Group Therapy Type: Recovery strategies    Summary of Group / Topics Discussed:    Disease of addiction, Leisure explorations/use of leisure time, and Self-care activities      Group Attendance:  Attended group session    Patient's response to the group topic/interactions:  cooperative with task    Patient appeared to be Actively participating, Attentive and Engaged.        Client specific details: Flower was an active participant in \"5 years sober vs. 5 years using\" group art activity.  "

## 2020-12-03 NOTE — PROGRESS NOTES
[address] [place] [address]                  Flower Logan, 1990, was admitted for evaluation/treatment of chemical dependency at [facility].  This person took part in these program(s):    ______ The Inpatient Program   ______ The Outpatient Program   ____x__ The Lodging Plus Program   ______ Lodging Day Outpatient       Date admitted: 11/06/2020  Date discharged: 12/04/2020    Type of discharge:   __x____ Satisfactory - completed evaluation / treatment   ______ Discharged without completing   ______ Behavioral discharge   ______ Transferred to another chemical dependency program   ______ Transferred to another type of service   ______ Left against medical advice (AMA) / Eloped       Comments: Maintain abstinence from all mood altering substances, attend 2+ AA/NA meetings per week ( or what is recommended by the [place] program), obtain and maintain contact with a program sponsor, enter the [place]  program and follow recommendations, enter 1.1 therapy, and maintain good physical and mental health care.        Counselor: CECIL Coyne                       Date: 12/3/2020             Time: 3:02 PM

## 2020-12-03 NOTE — PROGRESS NOTES
[facility]     Patient: Flower Logan  MRN: [id number]   : 1990 Age: 30 year old Sex: female   Focus of Treatment / Discharge Recommendations  Personal Safety/ Management of Symptoms   * Follow your safety plan.  Report increased symptoms to your care team and /or go to the nearest Emergency Department.   Call crisis lines as needed:       [facility] [phone]                [facility] [phone]    [facility] [phone]               Crisis Connection [phone]    [facility] [phone]              [facility] [phone]    [facility] [phone]          National Suicide Prevention [phone]    [facility] [phone]            Suicide Prevention [phone]    [facility] [phone]  Abstinence/Relapse Prevention  * Take all medicines as directed.  Carry a current list of medicines with you.  * Use coping skills: nutrition, rest, exercise, spirituality, journal, meditation, engage in activities you enjoy, seek sober support.   * Do not use illicit (street) drugs, controlled substances (narcotics) or alcohol.  Develop/Improve Independent Living/Socialization Skills: Ensure living environment is conducive to recovery.   Community Resources/Supports:     [facility] Alcoholics Anonymous:  [phone] ( 24 hours a day)    [place]  Alcoholics Anonymous : [phone]    Narcotics Anonymous : [address] ( [phone])    [place] Recovery Connection: Recovery resources.  [phone]    Access chats, online meetings, discussions and healthy check-in activities any day, any time, from anywhere. Participate as anonymously as you like. In order to access this resource,  Go to: [email or web address]   Click on the In recovery tab. Then click on Social Community. Click on The daily Pledge: people helping people  to join.    Discharge Planning: Maintain abstinence from all mood  altering substances, attend 2+ AA/NA meetings per week ( or what is recommended by the [place] program), obtain and maintain contact with a program sponsor, enter the [place]  program and follow recommendations, enter 1.1 therapy, and maintain good physical and mental health care.  Follow up with psychiatrist / main caregiver: PCP MD Britany Caicedo (will need to use a covering provider as this provider is on leave). [facility] psychiatry as needed. Next visit: TBD                                                                         Follow up with your therapist: Referrals provided  Next visit: TBD    Go to group therapy and / or support groups at: [place]     See your medical doctor about:  Ongoing physical health care    Client Signature:_______________________   Date / Time:___________  Staff Signature:________________________   Date / Time:___________

## 2020-12-04 NOTE — ADDENDUM NOTE
Encounter addended by: Scarlett Sapp LADC on: 12/4/2020 2:35 PM   Actions taken: Clinical Note Signed
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
## 2020-12-27 ENCOUNTER — HEALTH MAINTENANCE LETTER (OUTPATIENT)
Age: 30
End: 2020-12-27

## 2021-04-24 ENCOUNTER — HEALTH MAINTENANCE LETTER (OUTPATIENT)
Age: 31
End: 2021-04-24

## 2021-06-16 ENCOUNTER — TELEPHONE (OUTPATIENT)
Dept: PEDIATRICS | Facility: CLINIC | Age: 31
End: 2021-06-16

## 2021-06-16 NOTE — TELEPHONE ENCOUNTER
Patient Quality Outreach      Summary:    Patient has the following on her problem list/HM:     Depression / Dysthymia review           PHQ-9 SCORE 12/20/2018 7/6/2020 8/3/2020   PHQ-9 Total Score MyChart 13 (Moderate depression) 22 (Severe depression) 10 (Moderate depression)   PHQ-9 Total Score 13 22 10   Some encounter information is confidential and restricted. Go to Review Flowsheets activity to see all data.       If PHQ-9 recheck is 5 or more, route to provider for next steps.    Patient is due/failing the following:   Cervical Cancer Screening - PAP Needed, PHQ-9 Needed and Adult/Adolescent physical, date due: december 2019    Type of outreach:    Sent Telepath message.    Questions for provider review:    None                                                                                                                                     NICOLAS FAYE MA on 6/16/2021 at 9:08 AM

## 2021-10-03 ENCOUNTER — HEALTH MAINTENANCE LETTER (OUTPATIENT)
Age: 31
End: 2021-10-03

## 2021-11-12 NOTE — GROUP NOTE
Group Therapy Documentation    PATIENT'S NAME: Flower Logan  MRN:   [id number]  :   1990  ACCT. NUMBER: [id number]  DATE OF SERVICE: 20  START TIME: 12:30 PM  END TIME:  2:30 PM  FACILITATOR(S): Fe Connell LADC  TOPIC: BEH Group Therapy  Number of patients attending the group: 6  Group Length:  2 Hours    Group Therapy Type: Emotion processing    Summary of Group / Topics Discussed:    Disease of addiction, Relapse prevention, and Self-care activities      Group Attendance:  Attended group session    Patient's response to the group topic/interactions:  cooperative with task    Patient appeared to be Actively participating, Attentive and Engaged.        Client specific details: Flower attended one hour of the afternoon group therapy session, after returning from a psychiatry appointment.  She gave appropriate feedback to her peer who presented a treatment plan assignment.   within normal limits

## 2022-05-15 ENCOUNTER — HEALTH MAINTENANCE LETTER (OUTPATIENT)
Age: 32
End: 2022-05-15

## 2022-09-11 ENCOUNTER — HEALTH MAINTENANCE LETTER (OUTPATIENT)
Age: 32
End: 2022-09-11

## 2023-06-03 ENCOUNTER — HEALTH MAINTENANCE LETTER (OUTPATIENT)
Age: 33
End: 2023-06-03

## 2024-02-05 NOTE — PLAN OF CARE
Diabetic follow up 12/5/23,Scheduled physical 6/7/24    Tramadol 50mg bid prn pain #30 script last filled by Dr Licea 12/28/23.    Will send this request to Dr Ferguson to review.   JEMMA Logan 30/F      S = Situation:     Voluntary admit from [facility] for alcohol, assigned to Jose Maria         B  = Background:     First time being admitted for CD detox; Pt sent to detox before arriving at [facility]    Pt had been self-tapering with help of parents; drinking 0.5 liter vodka daily for 2-3 years, tapered to 5 shooters of fireball whiskey and bottle of wine daily for last 1-2 months, last drink 11/2 at 08:30    One time meth use on 10/31 (snorted and ingested)    Hx bipolar disorder, denies other medical conditions          A  =  Assessment:     MSSA  = 9  ALT 60, AST 53    Denied SI/SIB       R =   Request or Recommendation:     MSSA with valium, withdrawal precautions; Internal Med consult ordered